# Patient Record
Sex: FEMALE | Race: AMERICAN INDIAN OR ALASKA NATIVE
[De-identification: names, ages, dates, MRNs, and addresses within clinical notes are randomized per-mention and may not be internally consistent; named-entity substitution may affect disease eponyms.]

---

## 2017-09-01 ENCOUNTER — HOSPITAL ENCOUNTER (INPATIENT)
Dept: HOSPITAL 14 - H.ER | Age: 37
LOS: 2 days | Discharge: HOME | DRG: 134 | End: 2017-09-03
Attending: INTERNAL MEDICINE | Admitting: INTERNAL MEDICINE
Payer: MEDICAID

## 2017-09-01 DIAGNOSIS — E87.6: ICD-10-CM

## 2017-09-01 DIAGNOSIS — R00.0: ICD-10-CM

## 2017-09-01 DIAGNOSIS — E66.01: ICD-10-CM

## 2017-09-01 DIAGNOSIS — I10: ICD-10-CM

## 2017-09-01 DIAGNOSIS — Z86.73: ICD-10-CM

## 2017-09-01 DIAGNOSIS — Z79.82: ICD-10-CM

## 2017-09-01 DIAGNOSIS — F17.210: ICD-10-CM

## 2017-09-01 DIAGNOSIS — J98.11: ICD-10-CM

## 2017-09-01 DIAGNOSIS — I16.0: Primary | ICD-10-CM

## 2017-09-01 LAB
ALBUMIN/GLOB SERPL: 1.5 {RATIO} (ref 1–2.1)
ALP SERPL-CCNC: 68 U/L (ref 38–126)
ALT SERPL-CCNC: 33 U/L (ref 9–52)
APTT BLD: 34 SECONDS (ref 25.6–37.1)
AST SERPL-CCNC: 19 U/L (ref 14–36)
BASE EXCESS BLDV CALC-SCNC: 0.7 MMOL/L (ref 0–2)
BASOPHILS # BLD AUTO: 0.1 K/UL (ref 0–0.2)
BASOPHILS NFR BLD: 1.2 % (ref 0–2)
BILIRUB SERPL-MCNC: 0.7 MG/DL (ref 0.2–1.3)
BUN SERPL-MCNC: 10 MG/DL (ref 7–17)
CALCIUM SERPL-MCNC: 9.3 MG/DL (ref 8.4–10.2)
CHLORIDE SERPL-SCNC: 106 MMOL/L (ref 98–107)
CO2 SERPL-SCNC: 23 MMOL/L (ref 22–30)
EOSINOPHIL # BLD AUTO: 0.2 K/UL (ref 0–0.7)
EOSINOPHIL NFR BLD: 2.4 % (ref 0–4)
ERYTHROCYTE [DISTWIDTH] IN BLOOD BY AUTOMATED COUNT: 15.5 % (ref 11.5–14.5)
ETHANOL SERPL-MCNC: < 10 MG/DL (ref 0–10)
GLOBULIN SER-MCNC: 3 GM/DL (ref 2.2–3.9)
GLUCOSE SERPL-MCNC: 154 MG/DL (ref 65–105)
HCT VFR BLD CALC: 39.1 % (ref 34–47)
LYMPHOCYTES # BLD AUTO: 2.9 K/UL (ref 1–4.3)
LYMPHOCYTES NFR BLD AUTO: 30.1 % (ref 20–40)
MCH RBC QN AUTO: 23.8 PG (ref 27–31)
MCHC RBC AUTO-ENTMCNC: 32 G/DL (ref 33–37)
MCV RBC AUTO: 74.5 FL (ref 81–99)
MONOCYTES # BLD: 0.6 K/UL (ref 0–0.8)
MONOCYTES NFR BLD: 6.2 % (ref 0–10)
NEUTROPHILS # BLD: 5.8 K/UL (ref 1.8–7)
NEUTROPHILS NFR BLD AUTO: 60.1 % (ref 50–75)
NRBC BLD AUTO-RTO: 0.1 % (ref 0–0)
PCO2 BLDV: 40 MMHG (ref 40–60)
PH BLDV: 7.41 [PH] (ref 7.32–7.43)
PLATELET # BLD: 183 K/UL (ref 130–400)
PMV BLD AUTO: 10.1 FL (ref 7.2–11.7)
POTASSIUM SERPL-SCNC: 3.6 MMOL/L (ref 3.6–5)
PROT SERPL-MCNC: 7.4 G/DL (ref 6.3–8.2)
SODIUM SERPL-SCNC: 142 MMOL/L (ref 132–148)
TSH SERPL-ACNC: 1.94 MIU/ML (ref 0.46–4.68)
WBC # BLD AUTO: 9.7 K/UL (ref 4.8–10.8)
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
## 2017-09-01 NOTE — CT
PROCEDURE:  CT brain 09/01/2017.



HISTORY:

r/o ICH



COMPARISON:

None available. 



TECHNIQUE:

Axial computed tomography images were obtained through the head/brain 

without intravenous contrast.  



Radiation dose:



Total exam DLP = 1313.31 mGy-cm.



This CT exam was performed using one or more of the following dose 

reduction techniques: Automated exposure control, adjustment of the 

mA and/or kV according to patient size, and/or use of iterative 

reconstruction technique. 



FINDINGS:



HEMORRHAGE:

No acute parenchymal, subarachnoid or extra-axial hemorrhage. 



BRAIN:

No evidence of large acute infarct.  No focal areas of abnormal 

attenuation seen within the substance of the brain.  Ventricular and 

sulcal size are within range of normal this patient's stated age. 



VENTRICLES:

No evidence of obstructive hydrocephalus. 



CALVARIUM:

There are no acute calvarial fractures.



PARANASAL SINUSES:

Mild mucosal thickening right maxillary sinus with minimal mucosal 

thickening few ethmoid air cells and right chamber sphenoid sinus. 



MASTOID AIR CELLS:

Unremarkable as visualized. No inflammatory changes.



OTHER FINDINGS:

None.



IMPRESSION:

No acute intracranial hemorrhage.

## 2017-09-02 LAB
ALBUMIN/GLOB SERPL: 1.4 {RATIO} (ref 1–2.1)
ALP SERPL-CCNC: 64 U/L (ref 38–126)
ALT SERPL-CCNC: 27 U/L (ref 9–52)
AST SERPL-CCNC: 16 U/L (ref 14–36)
BILIRUB SERPL-MCNC: 0.5 MG/DL (ref 0.2–1.3)
BUN SERPL-MCNC: 8 MG/DL (ref 7–17)
CALCIUM SERPL-MCNC: 9.2 MG/DL (ref 8.4–10.2)
CHLORIDE SERPL-SCNC: 105 MMOL/L (ref 98–107)
CHOLEST SERPL-MCNC: 158 MG/DL (ref 0–199)
CO2 SERPL-SCNC: 24 MMOL/L (ref 22–30)
GLOBULIN SER-MCNC: 2.8 GM/DL (ref 2.2–3.9)
GLUCOSE SERPL-MCNC: 172 MG/DL (ref 65–105)
POTASSIUM SERPL-SCNC: 3.5 MMOL/L (ref 3.6–5)
PROT SERPL-MCNC: 6.7 G/DL (ref 6.3–8.2)
SODIUM SERPL-SCNC: 139 MMOL/L (ref 132–148)
TSH SERPL-ACNC: 1.85 MIU/ML (ref 0.46–4.68)

## 2017-09-02 RX ADMIN — PNEUMOCOCCAL VACCINE POLYVALENT ONE
25; 25; 25; 25; 25; 25; 25; 25; 25; 25; 25; 25; 25; 25; 25; 25; 25; 25; 25; 25; 25; 25; 25 INJECTION, SOLUTION INTRAMUSCULAR; SUBCUTANEOUS at 01:21

## 2017-09-02 RX ADMIN — PNEUMOCOCCAL VACCINE POLYVALENT ONE ML
25; 25; 25; 25; 25; 25; 25; 25; 25; 25; 25; 25; 25; 25; 25; 25; 25; 25; 25; 25; 25; 25; 25 INJECTION, SOLUTION INTRAMUSCULAR; SUBCUTANEOUS at 01:17

## 2017-09-02 NOTE — CARD
--------------- APPROVED REPORT --------------





EKG Measurement

Heart [id number]

AR 126P60

[id number]

[id number]

[id number]



<Conclusion>

Normal sinus rhythm

Biatrial enlargement

Left ventricular hypertrophy

Nonspecific T wave abnormality

Prolonged QT

Abnormal ECG

## 2017-09-02 NOTE — CARD
--------------- APPROVED REPORT --------------





EXAM: Two-dimensional and M-mode echocardiogram with Doppler and 

color Doppler.



Other Information 

Quality : AverageRhythm : NSR

Technically limited study due to  body habitus.



INDICATION

Hypertension/HCVD



2D DIMENSIONS 

IVSd1.11   (0.7-1.1cm)LVDd5.04   (3.9-5.9cm)

LVOT Diameter2.12   (1.8-2.4cm)PWd1.20   (0.7-1.1cm)

IVSs1.88   (0.8-1.2cm)LVDs3.56   (2.5-4.0cm)

FS (%) 29.5   %PWs1.72   (0.8-1.2cm)

LVEF (%)55.0   (>50%)



M-Mode DIMENSIONS 

Left Atrium (MM)4.31   (2.5-4.0cm)IVSd2.03   (0.7-1.1cm)

Aortic Root2.94   (2.2-3.7cm)LVDd4.63   (4.0-5.6cm)

Aortic Cusp Exc.2.06   (1.5-2.0cm)PWd1.69   (0.7-1.1cm)

IVSs2.38   cmFS (%) 41   %

LVDs2.75   (2.0-3.8cm)PWs2.41   cm



Mitral Valve

MV E Kxcoijkw08.5cm/sMV DECEL [id number] A Suflpjpc99.6cm/s

MV CLZ65woJ/A ratio1.2MVA (PHT)2.36cm2



TDI

Lateral E' Peak V8.80cm/sMedial E' Peak V5.95cm/sE/Lateral E'6.4

E/Medial E'9.5



Pulmonary Valve

PV Peak Nxximvgt601.6cm/s



 LEFT VENTRICLE 

The left ventricle is normal size.

There is mild concentric left ventricular hypertrophy.

The left ventricular function is normal.

The left ventricular ejection fraction is within the normal range.

There is normal LV segmental wall motion.

The left ventricular diastolic function is normal.



 RIGHT VENTRICLE 

The right ventricle is normal size.

There is normal right ventricular wall thickness.

The right ventricular systolic function is normal.



 ATRIA 

The left atrium is borderline dilated.

The right atrium size is normal.



 AORTIC VALVE 

The aortic valve is not well visualized.

No aortic regurgitation is present.

There is no aortic valvular stenosis.



 MITRAL VALVE 

The mitral valve is mildly thickened.

There is no mitral valve stenosis.

There is no mitral valve regurgitation noted.



 TRICUSPID VALVE 

The tricuspid valve is normal in structure and function.

There is no tricuspid valve regurgitation noted.



 PULMONIC VALVE 

The pulmonary valve is normal in structure and function.

There is no pulmonic valvular regurgitation.



 GREAT VESSELS 

The aortic root is normal in size.

The IVC was not visualized.



 PERICARDIAL EFFUSION 

The pericardium appears normal.



<Conclusion>

The left ventricle is normal size.

There is mild concentric left ventricular hypertrophy.

The left ventricular function is normal.

The left ventricular ejection fraction is within the normal range.

There is normal LV segmental wall motion.

The left ventricular diastolic function is normal.

## 2017-09-03 VITALS — OXYGEN SATURATION: 97 % | RESPIRATION RATE: 18 BRPM

## 2017-09-03 VITALS — HEART RATE: 70 BPM | SYSTOLIC BLOOD PRESSURE: 132 MMHG | DIASTOLIC BLOOD PRESSURE: 72 MMHG | TEMPERATURE: 98.2 F

## 2017-09-03 NOTE — HP
HISTORY OF PRESENT ILLNESS:  This is a 37-year-old  female

with no significant past medical history, presented to emergency room on

the day of admission with complaints of dizziness, headache and generalized

weakness.  The patient was evaluated in the emergency room and she was

found to have elevated blood pressure in the range of 180/110.  The patient

was being given multiple anti-hypertensive medications in the emergency

room and she also had a CAT scan of the head that did not reveal any

significant abnormality.  The patient is a poor historian and stated she

currently lives in a shelter and she recently moved from [place].



PAST MEDICAL HISTORY:  None significant.



FAMILY HISTORY:  Noncontributory.



SOCIAL HISTORY:  The patient is a smoker but denies ETOH or substance

abuse.



REVIEW OF SYSTEMS:  Other review of systems is negative.



ALLERGIES: NO KNOWN ALLERGIES.



MEDICATIONS:  None.



PHYSICAL EXAMINATION

GENERAL:  The patient is comfortable at the time of this examination.

VITAL SIGNS:  Blood pressure in the range of 110/70 after the patient was

already treated, pulse of 70, temperature 98.2, respiratory rate 18.

HEENT:  Pupils equal, reactive to light.  Normal appearing mucosa of the

conjunctivae, oropharynx, and nasal membrane mucosa.

NECK:  Supple.  No JVD.  No carotid bruit.  No lymph node.  No thyromegaly.

CHEST AND LUNGS:  Bilateral symmetrical expansion.  Good air exchange.  No

rales.  No rhonchi.

CARDIOVASCULAR:  PMI not localized.  S1 and S2.  No additional sounds.

ABDOMEN:  Normoactive bowel sounds.  No tenderness.  No organomegaly.  No

masses.

EXTREMITIES:  No cyanosis, no clubbing, no edema.

CENTRAL NERVOUS SYSTEM:  Alert, awake, and oriented x3.  No neurological

deficits could be appreciated.



IMPRESSION:

1.  Hypertensive urgency.

2.  Hypokalemia.



PLAN:  We will do echocardiogram and give patient Nifedipine XL 60 mg and

clonidine 0.2 mg every 8 hours with parameters.  Monitor electrolytes.  We

will also do blood work for lipid profile and the thyroid function test.





__________________________________________

Missael Tate MD





DD:  09/02/2017 21:33:57

DT:  09/03/2017 0:25:23

Job # [id number]

## 2017-09-04 NOTE — DS
REASON FOR ADMISSION:  This is a 37-year-old  female, who

was admitted for uncontrolled hypertension.



COURSE OF HOSPITALIZATION:  The patient was admitted to telemetry floor,

and she was started on antihypertensive medications, both nifedipine and

clonidine.  The patient's blood pressure was well controlled, and she was

discharged home on Procardia 30 mg daily as well as clonidine 0.1 mg q. 8

hours, and to followup with primary care physician.



FINAL DIAGNOSES:

1.  Uncontrolled hypertension.

2.  Morbid obesity.



The patient was counseled for diet and exercise and to follow up with

primary care physician and to take antihypertensive medications.







__________________________________________

Missael Tate MD





DD:  09/03/2017 21:11:35

DT:  09/03/2017 21:50:30

Job # [id number]

## 2017-09-05 NOTE — PQF BMI
***** This form is a permanent part of the medical record*****



9/5/17  Dr. Tate,



EMR has the patient listed as being 5' 9" , weighing 300 pounds with a BMI of 
44.3. Documentation of Morbid Obesity.



Would you please document the BMI.

          

Clarification of your documentation is requested to better reflect the severity 
of illness and intensity of treatment of your patient.  



Indicators present   



 [x] Documented BMI>40  



 [] Nutritional/Dietician consults

              

 [] 100 pounds or more over ideal body weight

   

 [x] Documented BMI / HT & WT: 5'9", weight 300 pounds, BMI 44.3

      

 [] Other : []

   

Location in the medical record that reflects the above clinical findings: []





Other Treatment Provided:  [x] Counseled for diet and exercise by  MD

         

PHYSICIAN'S RESPONSE

  



Based on your medical judgment of the clinical indicators outlined above, 
please define the following:



  [] Morbid obesity with a BMI of 44.3



  [] Morbid obesity with a BMI of ____________ PLEASE SPECIFY

 

  [] Other []

 

  [] If unable to determine, please check the box, sign and date.  



Present On Admission (POA) Indicator:



[] Present at the time of admission 



[] Not present at the time of admission



[] Clinically Undetermined

 







In responding to this query, please exercise your independent professional 
judgment.  The fact that a question is asked does not imply that any particular 
answer is desired or expected.  Thank you for your clarification on this 
documentation.



If you have any questions please call:[phone]

* Thank you,

   Mary Coronado RN

   CDMP
MTDD

## 2017-11-02 ENCOUNTER — HOSPITAL ENCOUNTER (EMERGENCY)
Dept: HOSPITAL 14 - H.ER | Age: 37
Discharge: HOME | End: 2017-11-02
Payer: MEDICAID

## 2017-11-02 VITALS
TEMPERATURE: 98 F | DIASTOLIC BLOOD PRESSURE: 82 MMHG | HEART RATE: 98 BPM | OXYGEN SATURATION: 99 % | SYSTOLIC BLOOD PRESSURE: 151 MMHG | RESPIRATION RATE: 18 BRPM

## 2017-11-02 DIAGNOSIS — Z76.0: Primary | ICD-10-CM

## 2017-11-02 NOTE — ED PDOC
HPI: General Adult


Time Seen by Provider: 11/02/17 20:45


Chief Complaint (Nursing): Med Refill


Chief Complaint (Provider): Med Refill


History Per: Patient


History/Exam Limitations: no limitations


Onset/Duration Of Symptoms: Days (x3)


Current Symptoms Are (Timing): Still Present


Additional Complaint(s): 





Ngozi is a 38 y/o female who presents to the ED for med refill, states she ran 

out of Procardia 3 days ago. Complaining of mild headache. Blood pressure here 

is normal. 





PMD: [facility] 





Past Medical History


Reviewed: Historical Data, Nursing Documentation, Vital Signs


Vital Signs: 





 Last Vital Signs











Temp  98 F   11/02/17 20:36


 


Pulse  98 H  11/02/17 20:36


 


Resp  18   11/02/17 20:36


 


BP  151/82 H  11/02/17 20:36


 


Pulse Ox  99   11/02/17 20:36














- Medical History


PMH: No Chronic Diseases


   Denies: HIV, Hyperthyroidism, Migraine, Chronic Kidney Disease





- Surgical History


Surgical History: No Surg Hx





- Family History


Family History: States: Unknown Family Hx





- Social History


Current smoker - smoking cessation education provided: Yes (Light)


Alcohol: None


Drugs: Denies





- Home Medications


Home Medications: 


 Ambulatory Orders











 Medication  Instructions  Recorded


 


Acetaminophen [Tylenol 325mg tab] 650 mg PO Q6 PRN  tab 09/03/17


 


NIFEdipine ER [Procardia XL] 30 mg PO DAILY #30 ter 09/03/17


 


cloNIDine [Catapres] 0.1 mg PO Q8 #90 tab 09/03/17


 


NIFEdipine ER [Procardia XL] 30 mg PO DAILY #15 tab 11/02/17














- Allergies


Allergies/Adverse Reactions: 


 Allergies











Allergy/AdvReac Type Severity Reaction Status Date / Time


 


No Known Allergies Allergy   Verified 09/01/17 14:39














Review of Systems


ROS Statement: Except As Marked, All Systems Reviewed And Found Negative


Neurological: Positive for: Headache (mild).  Negative for: Dizziness





Physical Exam





- Reviewed


Nursing Documentation Reviewed: Yes


Vital Signs Reviewed: Yes





- Physical Exam


Appears: Positive for: Non-toxic, No Acute Distress


Head Exam: Positive for: ATRAUMATIC, NORMAL INSPECTION, NORMOCEPHALIC


Skin: Positive for: Normal Color, Warm, Dry


Eye Exam: Positive for: EOMI, Normal appearance, PERRL


Neck: Positive for: Normal, Supple


Neurologic/Psych: Positive for: Alert, Oriented





- ECG


O2 Sat by Pulse Oximetry: 99 (RA)


Pulse Ox Interpretation: Normal





Medical Decision Making


Medical Decision Making: 





Time: 20:54


Clinical Impression: Encounter for med refill





Patient medically stable for discharge. Given Rx for Procardia. Patient to 

follow up with PMD. Return if symptoms persist or worsen. 





--------------------------------------------------------------------------------

-----------------


Scribe Attestation:   


Documented by Shanti Hazel, acting as a scribe for Sunshine Valencia PA-C





Provider Scribe Attestation:


All medical record entries made by the Scribe were at my direction and 

personally dictated by me. I have reviewed the chart and agree that the record 

accurately reflects my personal performance of the history, physical exam, 

medical decision making, and the department course for this patient. I have 

also personally directed, reviewed, and agree with the discharge instructions 

and disposition.





Disposition





- Clinical Impression


Clinical Impression: 


 Medication refill








- Patient ED Disposition


Is Patient to be Admitted: No


Counseled Patient/Family Regarding: Need For Followup, Rx Given





- Disposition


Disposition: Routine/Home


Disposition Time: 20:54


Condition: FAIR


Prescriptions: 


NIFEdipine ER [Procardia XL] 30 mg PO DAILY #15 tab


Instructions:  Nifedipine (By mouth), Hypertension (ED)


Forms:  [facility] (English)

## 2017-11-28 ENCOUNTER — HOSPITAL ENCOUNTER (EMERGENCY)
Dept: HOSPITAL 14 - H.ER | Age: 37
Discharge: HOME | End: 2017-11-28
Payer: MEDICAID

## 2017-11-28 VITALS — SYSTOLIC BLOOD PRESSURE: 149 MMHG | HEART RATE: 90 BPM | DIASTOLIC BLOOD PRESSURE: 92 MMHG

## 2017-11-28 VITALS — TEMPERATURE: 97 F | OXYGEN SATURATION: 99 % | RESPIRATION RATE: 18 BRPM

## 2017-11-28 DIAGNOSIS — R07.89: Primary | ICD-10-CM

## 2017-11-28 LAB
ALBUMIN/GLOB SERPL: 1.2 {RATIO} (ref 1–2.1)
ALP SERPL-CCNC: 63 U/L (ref 38–126)
ALT SERPL-CCNC: 26 U/L (ref 9–52)
AST SERPL-CCNC: 39 U/L (ref 14–36)
BASOPHILS # BLD AUTO: 0.2 K/UL (ref 0–0.2)
BASOPHILS NFR BLD: 1.4 % (ref 0–2)
BILIRUB SERPL-MCNC: 0.8 MG/DL (ref 0.2–1.3)
BUN SERPL-MCNC: 14 MG/DL (ref 7–17)
CALCIUM SERPL-MCNC: 9.1 MG/DL (ref 8.4–10.2)
CHLORIDE SERPL-SCNC: 108 MMOL/L (ref 98–107)
CO2 SERPL-SCNC: 24 MMOL/L (ref 22–30)
EOSINOPHIL # BLD AUTO: 0.2 K/UL (ref 0–0.7)
EOSINOPHIL NFR BLD: 1.8 % (ref 0–4)
ERYTHROCYTE [DISTWIDTH] IN BLOOD BY AUTOMATED COUNT: 16.1 % (ref 11.5–14.5)
GLOBULIN SER-MCNC: 3.6 GM/DL (ref 2.2–3.9)
GLUCOSE SERPL-MCNC: 207 MG/DL (ref 65–105)
HCT VFR BLD CALC: 39.9 % (ref 34–47)
LYMPHOCYTES # BLD AUTO: 3.2 K/UL (ref 1–4.3)
LYMPHOCYTES NFR BLD AUTO: 25.7 % (ref 20–40)
MCH RBC QN AUTO: 23.9 PG (ref 27–31)
MCHC RBC AUTO-ENTMCNC: 31.6 G/DL (ref 33–37)
MCV RBC AUTO: 75.5 FL (ref 81–99)
MONOCYTES # BLD: 0.7 K/UL (ref 0–0.8)
MONOCYTES NFR BLD: 5.5 % (ref 0–10)
NEUTROPHILS # BLD: 8.3 K/UL (ref 1.8–7)
NEUTROPHILS NFR BLD AUTO: 65.6 % (ref 50–75)
NRBC BLD AUTO-RTO: 0.1 % (ref 0–0)
PLATELET # BLD: 161 K/UL (ref 130–400)
PMV BLD AUTO: 10.4 FL (ref 7.2–11.7)
POTASSIUM SERPL-SCNC: 5.4 MMOL/L (ref 3.6–5)
PROT SERPL-MCNC: 8 G/DL (ref 6.3–8.2)
SODIUM SERPL-SCNC: 140 MMOL/L (ref 132–148)
WBC # BLD AUTO: 12.6 K/UL (ref 4.8–10.8)

## 2017-11-28 NOTE — RAD
HISTORY:

chest pain  



COMPARISON:

Chest x-ray performed 9/1/17 



TECHNIQUE:

Chest, one view.



FINDINGS:

Examination limited by habitus.



LUNGS:

Mild interstitial prominence possibly chronic however mild infection 

or edema cannot be entirely excluded.  Correlate clinically. No focal 

consolidation.



Please note that chest x-ray has limited sensitivity for the 

detection of pulmonary masses.



PLEURA:

No significant pleural effusion identified. No definite pneumothorax .



CARDIOVASCULAR:

Heart size appears top normal.



OSSEOUS STRUCTURES:

 No acute osseous abnormality identified.



VISUALIZED UPPER ABDOMEN:

Unremarkable.



OTHER FINDINGS:

None.



IMPRESSION:

Mild interstitial prominence possibly chronic however mild infection 

or edema cannot be entirely excluded. Correlate clinically.

## 2017-11-28 NOTE — ED PDOC
HPI: Chest Pain


Time Seen by Provider: 11/28/17 07:44


Chief Complaint (Nursing): Upper Extremity Problem/Injury


Chief Complaint (Provider): Left sided chest pain


History Per: Patient


History/Exam Limitations: no limitations


Onset/Duration Of Symptoms: Days (x1)


Current Symptoms Are (Timing): Still Present


Pain Scale Rating Of: 8


Associated Symptoms: denies: Nausea


Additional Complaint(s): 





Ngozi Malik is a 37 year old female, with no past medical history, who 

presents to the emergency department complaining of a worsening left sided 

chest pain onset since yesterday. Patient states she was at work yesterday when 

the pain began. She took Tylenol with no relief of symptoms. She denies any 

fever, chills, cough, nausea, vomit or diarrhea. No further medical complaints.





PMD: None provided. 





Past Medical History


Reviewed: Historical Data, Nursing Documentation, Vital Signs


Vital Signs: 


 Last Vital Signs











Temp  97 F L  11/28/17 07:42


 


Pulse  99 H  11/28/17 14:04


 


Resp  18   11/28/17 07:42


 


BP  169/111 H  11/28/17 07:42


 


Pulse Ox  99   11/28/17 14:04














- Medical History


PMH: 


   Denies: HIV, Hyperthyroidism, Migraine, Chronic Kidney Disease





- Family History


Family History: States: Unknown Family Hx





- Social History


Current smoker - smoking cessation education provided: Yes (light smoker <10 

cigarettes daily)


Alcohol: None


Drugs: Denies





- Home Medications


Home Medications: 


 Ambulatory Orders











 Medication  Instructions  Recorded


 


Acetaminophen [Tylenol 325mg tab] 650 mg PO Q6 PRN  tab 09/03/17


 


NIFEdipine ER [Procardia XL] 30 mg PO DAILY #30 ter 09/03/17


 


cloNIDine [Catapres] 0.1 mg PO Q8 #90 tab 09/03/17


 


NIFEdipine ER [Procardia XL] 30 mg PO DAILY #15 tab 11/02/17


 


Azithromycin [Zithromax] 250 mg PO DAILY #6 tab 11/28/17














- Allergies


Allergies/Adverse Reactions: 


 Allergies











Allergy/AdvReac Type Severity Reaction Status Date / Time


 


No Known Allergies Allergy   Verified 09/01/17 14:39














Review of Systems


ROS Statement: Except As Marked, All Systems Reviewed And Found Negative


Constitutional: Negative for: Fever, Chills


Cardiovascular: Positive for: Chest Pain (left sided)


Respiratory: Negative for: Cough


Gastrointestinal: Negative for: Nausea, Vomiting, Diarrhea





Physical Exam





- Reviewed


Nursing Documentation Reviewed: Yes


Vital Signs Reviewed: Yes





- Physical Exam


Appears: Positive for: Well, Non-toxic, No Acute Distress


Head Exam: Positive for: ATRAUMATIC, NORMAL INSPECTION, NORMOCEPHALIC


Skin: Positive for: Normal Color, Warm, Dry


Eye Exam: Positive for: EOMI, Normal appearance, PERRL


Neck: Positive for: Normal, Painless ROM, Supple


Cardiovascular/Chest: Positive for: Regular Rate, Rhythm.  Negative for: Murmur


Respiratory: Positive for: Normal Breath Sounds.  Negative for: Respiratory 

Distress


Gastrointestinal/Abdominal: Positive for: Normal Exam, Bowel Sounds, Soft.  

Negative for: Tenderness, Guarding, Rebound


Back: Positive for: Normal Inspection.  Negative for: L CVA Tenderness, R CVA 

Tenderness


Extremity: Positive for: Normal ROM.  Negative for: Deformity, Swelling


Neurologic/Psych: Positive for: Alert, Oriented.  Negative for: Motor/Sensory 

Deficits





- Laboratory Results


Result Diagrams: 


 11/28/17 08:43





 11/28/17 08:43





- ECG


ECG Rhythm: Positive for: Sinus Rhythm (normal)


Interpretation Of ECG: 





Left atrial enlargement


Rate: 99


O2 Sat by Pulse Oximetry: 99 (RA)


Pulse Ox Interpretation: Normal





Medical Decision Making


Medical Decision Making: 





Initial Impression: Chest pain





Initial Plan:





--Comp metabolic Panel


--Troponin I


--CBC w/ differential


--Chest portable [RAD]


--reevaluation








1028


Chest x-ray


FINDINGS:


Examination limited by habitus.





LUNGS:


Mild interstitial prominence possibly chronic however mild infection or edema 

cannot be entirely excluded.  Correlate clinically. No focal consolidation.





Please note that chest x-ray has limited sensitivity for the detection of 

pulmonary masses.





PLEURA:


No significant pleural effusion identified. No definite pneumothorax .





CARDIOVASCULAR:


Heart size appears top normal.





OSSEOUS STRUCTURES:


 No acute osseous abnormality identified.





VISUALIZED UPPER ABDOMEN:


Unremarkable.





OTHER FINDINGS:


None.





IMPRESSION:


Mild interstitial prominence possibly chronic however mild infection or edema 

cannot be entirely excluded. Correlate clinically.








1350


-Upon provider evaluation patient is medically stable, and requires no further 

treatment in the ED at this time. Patient will be discharged home. Counseling 

was provided and all questions were answered regarding diagnosis and need for 

follow up. There is agreement to discharge plan. Return if symptoms persist or 

worsen.





--------------------------------------------------------------------------------

-----------------


Scribe Attestation:


Documented by Rehan Triplett, acting as a scribe for Barrett Lepe MD





Provider Scribe Attestation:


All medical record entries made by the Scribe were at my direction and 

personally dictated by me. I have reviewed the chart and agree that the record 

accurately reflects my personal performance of the history, physical exam, 

medical decision making, and the department course for this patient. I have 

also personally directed, reviewed, and agree with the discharge instructions 

and disposition.





Disposition





- Clinical Impression


Clinical Impression: 


 Chest pain








- Disposition


Referrals: 


[facility] [Outside]


[facility] [Outside]


Disposition Time: 13:50


Condition: IMPROVED


Additional Instructions: 


follow up with your primary doctor in 1- 2 days


return to the ED with any worsening or concerning symptoms


Prescriptions: 


Azithromycin [Zithromax] 250 mg PO DAILY #6 tab


Instructions:  Costochondritis (ED)


Forms:  [facility] (English)

## 2017-12-18 ENCOUNTER — HOSPITAL ENCOUNTER (EMERGENCY)
Dept: HOSPITAL 14 - H.ER | Age: 37
Discharge: HOME | End: 2017-12-18
Payer: MEDICAID

## 2017-12-18 VITALS
SYSTOLIC BLOOD PRESSURE: 143 MMHG | TEMPERATURE: 98.1 F | RESPIRATION RATE: 18 BRPM | DIASTOLIC BLOOD PRESSURE: 73 MMHG | OXYGEN SATURATION: 98 % | HEART RATE: 80 BPM
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
## 2017-12-18 DIAGNOSIS — M25.551: Primary | ICD-10-CM

## 2017-12-18 NOTE — ED PDOC
Lower Extremity Pain/Injury


Time Seen by Provider: 12/18/17 21:15


Chief Complaint (Nursing): Hip Pain


Chief Complaint (Provider): Right Hip Pain


History Per: Patient


History/Exam Limitations: no limitations


Onset/Duration Of Symptoms: Days (x3)


Current Symptoms Are (Timing): Still Present


Additional Complaint(s): 





Ngozi Malik is a 37 year old female that presents to the ED with a 

chief complaint of right hip pain that she has been experiencing for the past 

three days as a result of a fall. Patient reports that three days ago, she was 

shopping when she tripped and fell in the aisle. She states that she hit her 

head and does not recollect anything until she regained consciousness when she 

was with EMS, who brought her to [facility] [place] [facility]. Patient reports that she had an X-Ray 

performed on her left hip, but not her right. She states that she has been 

taking Tylenol for her pain and that it has not been providing her any relief. 





Past Medical History


Reviewed: Historical Data, Nursing Documentation, Vital Signs


Vital Signs: 





 Last Vital Signs











Temp  98.1 F   12/18/17 21:05


 


Pulse  80   12/18/17 21:05


 


Resp  18   12/18/17 21:05


 


BP  143/73   12/18/17 21:05


 


Pulse Ox  98   12/18/17 21:05














- Medical History


PMH: 


   Denies: HIV, Hyperthyroidism, Migraine, Chronic Kidney Disease





- Family History


Family History: States: Unknown Family Hx





- Home Medications


Home Medications: 


 Ambulatory Orders











 Medication  Instructions  Recorded


 


Acetaminophen [Tylenol 325mg tab] 650 mg PO Q6 PRN  tab 09/03/17


 


NIFEdipine ER [Procardia XL] 30 mg PO DAILY #30 ter 09/03/17


 


cloNIDine [Catapres] 0.1 mg PO Q8 #90 tab 09/03/17


 


NIFEdipine ER [Procardia XL] 30 mg PO DAILY #15 tab 11/02/17


 


Azithromycin [Zithromax] 250 mg PO DAILY #6 tab 11/28/17


 


Naproxen 1 tab PO Q12 PRN #14 tab 12/18/17














- Allergies


Allergies/Adverse Reactions: 


 Allergies











Allergy/AdvReac Type Severity Reaction Status Date / Time


 


No Known Allergies Allergy   Verified 09/01/17 14:39














Review of Systems


Musculoskeletal: Positive for: Leg Pain (right hip pain)





Physical Exam





- Reviewed


Nursing Documentation Reviewed: Yes


Vital Signs Reviewed: Yes





- Physical Exam


Appears: Positive for: Non-toxic, No Acute Distress (Patient is very sleepy 

during exam.)


Head Exam: Positive for: ATRAUMATIC, NORMOCEPHALIC


Skin: Positive for: Normal Color, Warm


Eye Exam: Positive for: Normal appearance, EOMI, PERRL


Back: Negative for: Normal Inspection (Mild paralumbar TTP)


Extremity: Positive for: Other (TTP gluteal region. No ecchymosis noted.)


Neurologic/Psych: Positive for: Alert, Oriented.  Negative for: Motor/Sensory 

Deficits





- ECG


O2 Sat by Pulse Oximetry: 98 (RA)


Pulse Ox Interpretation: Normal





- Progress


ED Course And Treament: 





XRY OF HIP: NEG FOR FX








Medical Decision Making


Medical Decision Making: 





Impression: Right Hip Pain





Plan:


* X-Ray Right Hip


* Urine Pregnancy


* Reevaluation


--------------------------------------------------------------------------------

----------------- 


Scribe Attestation:


Documented by Steffi Young, acting as a scribe for Sunshine Valencia PA-C.





Provider Scribe Attestation:


All medical record entries made by the Scribe were at my direction and 

personally dictated by me. I have reviewed the chart and agree that the record 

accurately reflects my personal performance of the history, physical exam, 

medical decision making, and the department course for this patient. I have 

also personally directed, reviewed, and agree with the discharge instructions 

and disposition.





Disposition





- Clinical Impression


Clinical Impression: 


 Hip pain








- Patient ED Disposition


Is Patient to be Admitted: No





- Disposition


Referrals: 


[facility] [Outside]


Disposition: Routine/Home


Disposition Time: 23:39


Condition: FAIR


Prescriptions: 


Naproxen 1 tab PO Q12 PRN #14 tab


 PRN Reason: Pain, Moderate (4-7)


Instructions:  Contusion in Adults (ED), Acute Low Back Pain (GEN)


Forms:  [facility] (English)

## 2017-12-19 NOTE — RAD
PROCEDURE:  Right Hip Radiographs.



HISTORY:

Fall



COMPARISON:

None.



FINDINGS:



BONES:

The pelvic ring is intact. There is an apparent longitudinal lucency 

in the lateral right intertrochanteric region. There is no bone 

destruction.  Bone alignment and mineralization are normal. 



JOINTS:

The hip joint spaces are preserved. There is mild degenerative 

osteoarthrosis in the right sacroiliac joint.  There is moderate 

osteitis pubis. 



SOFT TISSUES:

Normal. 



OTHER FINDINGS:

None.



IMPRESSION:

No acute displaced fracture or dislocation. Apparent longitudinal 

lucency in the lateral right intertrochanteric region is nonspecific 

and could be artifactual however nondisplaced fracture cannot be 

entirely excluded. Please note occult fractures cannot be excluded on 

plain radiographs.  If there is a persistent clinical concern, an MRI 

of the hip may be performed for further evaluation.

## 2018-01-07 ENCOUNTER — HOSPITAL ENCOUNTER (EMERGENCY)
Dept: HOSPITAL 14 - H.ER | Age: 38
Discharge: HOME | End: 2018-01-07
Payer: MEDICAID

## 2018-01-07 VITALS — OXYGEN SATURATION: 100 %

## 2018-01-07 VITALS
DIASTOLIC BLOOD PRESSURE: 87 MMHG | RESPIRATION RATE: 18 BRPM | SYSTOLIC BLOOD PRESSURE: 134 MMHG | TEMPERATURE: 98.6 F | HEART RATE: 87 BPM

## 2018-01-07 DIAGNOSIS — J32.0: ICD-10-CM

## 2018-01-07 DIAGNOSIS — I10: ICD-10-CM

## 2018-01-07 DIAGNOSIS — R51: Primary | ICD-10-CM

## 2018-01-07 DIAGNOSIS — E11.9: ICD-10-CM

## 2018-01-07 LAB
BASOPHILS # BLD AUTO: 0.2 K/UL (ref 0–0.2)
BASOPHILS NFR BLD: 1.5 % (ref 0–2)
BUN SERPL-MCNC: 8 MG/DL (ref 7–17)
CALCIUM SERPL-MCNC: 8.9 MG/DL (ref 8.4–10.2)
EOSINOPHIL # BLD AUTO: 0.2 K/UL (ref 0–0.7)
EOSINOPHIL NFR BLD: 1.9 % (ref 0–4)
ERYTHROCYTE [DISTWIDTH] IN BLOOD BY AUTOMATED COUNT: 15.2 % (ref 11.5–14.5)
GFR NON-AFRICAN AMERICAN: > 60
HGB BLD-MCNC: 12.3 G/DL (ref 12–16)
LYMPHOCYTES # BLD AUTO: 2.4 K/UL (ref 1–4.3)
LYMPHOCYTES NFR BLD AUTO: 20 % (ref 20–40)
MCH RBC QN AUTO: 23.9 PG (ref 27–31)
MCHC RBC AUTO-ENTMCNC: 31.4 G/DL (ref 33–37)
MCV RBC AUTO: 76.2 FL (ref 81–99)
MONOCYTES # BLD: 0.6 K/UL (ref 0–0.8)
MONOCYTES NFR BLD: 5.3 % (ref 0–10)
NEUTROPHILS # BLD: 8.5 K/UL (ref 1.8–7)
NEUTROPHILS NFR BLD AUTO: 71.3 % (ref 50–75)
NRBC BLD AUTO-RTO: 0.2 % (ref 0–0)
PLATELET # BLD: 186 K/UL (ref 130–400)
PMV BLD AUTO: 10.1 FL (ref 7.2–11.7)
RBC # BLD AUTO: 5.15 MIL/UL (ref 3.8–5.2)
WBC # BLD AUTO: 11.9 K/UL (ref 4.8–10.8)

## 2018-01-07 PROCEDURE — 85025 COMPLETE CBC W/AUTO DIFF WBC: CPT

## 2018-01-07 PROCEDURE — 80353 DRUG SCREENING COCAINE: CPT

## 2018-01-07 PROCEDURE — 80324 DRUG SCREEN AMPHETAMINES 1/2: CPT

## 2018-01-07 PROCEDURE — 99284 EMERGENCY DEPT VISIT MOD MDM: CPT

## 2018-01-07 PROCEDURE — 80346 BENZODIAZEPINES1-12: CPT

## 2018-01-07 PROCEDURE — 96361 HYDRATE IV INFUSION ADD-ON: CPT

## 2018-01-07 PROCEDURE — 81025 URINE PREGNANCY TEST: CPT

## 2018-01-07 PROCEDURE — 80361 OPIATES 1 OR MORE: CPT

## 2018-01-07 PROCEDURE — 80048 BASIC METABOLIC PNL TOTAL CA: CPT

## 2018-01-07 PROCEDURE — 96375 TX/PRO/DX INJ NEW DRUG ADDON: CPT

## 2018-01-07 PROCEDURE — 70450 CT HEAD/BRAIN W/O DYE: CPT

## 2018-01-07 PROCEDURE — 83992 ASSAY FOR PHENCYCLIDINE: CPT

## 2018-01-07 PROCEDURE — 80345 DRUG SCREENING BARBITURATES: CPT

## 2018-01-07 PROCEDURE — 96374 THER/PROPH/DIAG INJ IV PUSH: CPT

## 2018-01-07 PROCEDURE — 80349 CANNABINOIDS NATURAL: CPT

## 2018-01-07 PROCEDURE — 80358 DRUG SCREENING METHADONE: CPT

## 2018-01-07 NOTE — CT
PROCEDURE:  CT HEAD WITHOUT CONTRAST.



HISTORY:

headache



COMPARISON:

09/01/2017 



TECHNIQUE:

Axial computed tomography images were obtained through the head/brain 

without intravenous contrast.  



Radiation dose:



Total exam DLP = 1603 mGy-cm.



This CT exam was performed using one or more of the following dose 

reduction techniques: Automated exposure control, adjustment of the 

mA and/or kV according to patient size, and/or use of iterative 

reconstruction technique.



FINDINGS:



HEMORRHAGE:

No intracranial hemorrhage. 



BRAIN:

No mass effect or edema.  No cortical atrophy is seen. No 

intracranial hemorrhage is noted. No extra-axial fluid collection is 

seen. Ventricles are normal in size and midline. No appreciable 

decreased density to suggest recent infarct is noted.  There is a 

stable small a medial left temporal lobe very vascular space.  

Posterior fossa is unremarkable. Pituitary gland is normal in size.  

No tonsillar ectopia is seen.



VENTRICLES:

Unremarkable. No hydrocephalus. 



CALVARIUM:

Unremarkable.



PARANASAL SINUSES:

Since the prior examination there has been interval increase in 

mucosal inflammatory changes in the right maxillary sinus.  There 

appears to be obstruction of the right ostiomeatal complex on the 

coronal images. Mild mucosal changes are seen in the sinuses 

elsewhere.



MASTOID AIR CELLS:

Unremarkable as visualized. No inflammatory changes.



OTHER FINDINGS:

None.



IMPRESSION:

No evidence of recent infarct or intracranial hemorrhage. Moderate 

right maxillary sinusitis. 



No other significant significant interval change from prior study.

## 2018-01-07 NOTE — ED PDOC
HPI:  Headache


Time Seen by Provider: 01/07/18 14:06


Chief Complaint (Nursing): Headache


Chief Complaint (Provider): Headache


History Per: Patient


History/Exam Limitations: no limitations


Onset/Duration Of Symptoms: Days (7 days ago), Intermittent Episodes


Current Symptoms Are (Timing): Constant


Additional Complaint(s): 


38 y/o female with a history of hypertension, presents to the ED complaining of 

constant, intermittent headache, onset of 7 days. Patient states that she has 

been having headaches all her life, and is usually triggered when her blood 

pressure is high due to her history of hypertension. She describes the headache 

as pressure radiating throughout her entire head, and normally experiences some 

relief after taking Clonatin coupled with another type of high blood pressure 

medication, not directly specified. She presents today with a headache, onset 

of 1 hr ago after not be able to find the Clonatin and has been taking the 

other medication without relief, and notes that she is sensitive to light. 





PCP: Rodrigo Lacey








Past Medical History


Reviewed: Historical Data, Nursing Documentation, Vital Signs


Vital Signs: 





 Last Vital Signs











Temp  97.5 F L  01/07/18 13:54


 


Pulse  115 H  01/07/18 13:54


 


Resp  16   01/07/18 13:54


 


BP  189/112 H  01/07/18 13:54


 


Pulse Ox  100   01/07/18 13:54














- Medical History


PMH: Diabetes, HTN


   Denies: HIV, Hyperthyroidism, Migraine, Chronic Kidney Disease





- Surgical History


Surgical History: No Surg Hx





- Family History


Family History: States: Unknown Family Hx





- Social History


Current smoker - smoking cessation education provided: Yes (10 cigarettes daily)


Alcohol: None


Drugs: Denies





- Home Medications


Home Medications: 


 Ambulatory Orders











 Medication  Instructions  Recorded


 


Acetaminophen [Tylenol 325mg tab] 650 mg PO Q6 PRN  tab 09/03/17


 


NIFEdipine ER [Procardia XL] 30 mg PO DAILY #30 ter 09/03/17


 


NIFEdipine ER [Procardia XL] 30 mg PO DAILY #15 tab 11/02/17


 


Azithromycin [Zithromax] 250 mg PO DAILY #6 tab 11/28/17


 


Naproxen 1 tab PO Q12 PRN #14 tab 12/18/17


 


cloNIDine [Catapres] 0.1 mg PO Q8 #90 tab 01/07/18














- Allergies


Allergies/Adverse Reactions: 


 Allergies











Allergy/AdvReac Type Severity Reaction Status Date / Time


 


Seafood Allergy  SHORTNESS Uncoded 01/07/18 13:55





   OF BREATH  














Review of Systems


ROS Statement: Except As Marked, All Systems Reviewed And Found Negative


Neurological: Positive for: Headache, Other (sensitive to light)





Physical Exam





- Reviewed


Nursing Documentation Reviewed: Yes


Vital Signs Reviewed: Yes





- Physical Exam


Appears: Positive for: Non-toxic, No Acute Distress


Head Exam: Positive for: ATRAUMATIC, NORMOCEPHALIC


Skin: Positive for: Normal Color, Warm


Eye Exam: Positive for: Normal appearance, EOMI, PERRL


ENT: Positive for: Normal ENT Inspection


Neck: Positive for: Normal, Painless ROM, Supple


Cardiovascular/Chest: Positive for: Regular Rate, Rhythm.  Negative for: Murmur


Respiratory: Positive for: Normal Breath Sounds.  Negative for: Respiratory 

Distress


Gastrointestinal/Abdominal: Positive for: Normal Exam, Soft.  Negative for: 

Tenderness


Back: Positive for: Normal Inspection


Extremity: Positive for: Normal ROM.  Negative for: Pedal Edema, Deformity


Neurologic/Psych: Positive for: Alert, Oriented.  Negative for: Motor/Sensory 

Deficits





- Laboratory Results


Result Diagrams: 


 01/07/18 15:15





 01/07/18 15:15





- ECG


O2 Sat by Pulse Oximetry: 100 (RA)


Pulse Ox Interpretation: Normal





- Progress


Re-evaluation Time: 18:56


Condition: Re-examined, Improved





Medical Decision Making


Medical Decision Making: 


Time: 14:37


Impression: 


--Headache in setting of hypertension


Differential:


--Hypertensive urgent, intracranial bleeding due to hypertension


Plan:


--CT Head W/O Contrast


--Labs


--Drug Screen, Urine


--ED Urine Dip


--Ed Urine Pregnancy


--Clonidine 0.2mg PO


--Toradol 30mg IVP


--Metoclopramide 10mg IVP


--IV Fluids


--Reassess





Time: 17:28


CT Head Findings:


HEMORRHAGE:


No intracranial hemorrhage. 





BRAIN:


No mass effect or edema.  No cortical atrophy is seen. No intracranial 

hemorrhage is noted. No extra-axial fluid collection is seen. Ventricles are 

normal in size and midline. No appreciable decreased density to suggest recent 

infarct is noted.  There is a stable small a medial left temporal lobe very 

vascular space.  Posterior fossa is unremarkable. Pituitary gland is normal in 

size.  No tonsillar ectopia is seen.





VENTRICLES:


Unremarkable. No hydrocephalus. 





CALVARIUM:


Unremarkable.





PARANASAL SINUSES:


Since the prior examination there has been interval increase in mucosal 

inflammatory changes in the right maxillary sinus.  There appears to be 

obstruction of the right ostiomeatal complex on the coronal images. Mild 

mucosal changes are seen in the sinuses elsewhere.





MASTOID AIR CELLS:


Unremarkable as visualized. No inflammatory changes.





OTHER FINDINGS:


None.





IMPRESSION:


No evidence of recent infarct or intracranial hemorrhage. Moderate right 

maxillary sinusitis. 


No other significant significant interval change from prior study.





Time: 18:56


--Upon provider evaluation patient's condition is improved and patient is 

feeling much better. Patient will be discharged with Rx for Catapres. 

Counseling was provided and all questions were answered regarding diagnosis and 

need for follow up with PMD. There is agreement to discharge plan. Return if 

symptoms persist or worsen.








--------------------------------------------------------------------------------

------------------------------------------------


Scribe Attestation:


Documented by Jere Henning and Nitesh Stanley acting as a scribe for Kelsey Pascal MD. 








Disposition





- Clinical Impression


Clinical Impression: 


 Headache








- Patient ED Disposition


Is Patient to be Admitted: No


Counseled Patient/Family Regarding: Studies Performed, Diagnosis, Need For 

Followup, Rx Given





- Disposition


Referrals: 


[facility] [Outside]


Disposition: Routine/Home


Disposition Time: 18:56


Condition: GOOD


Additional Instructions: 


Return for worsening. Follow up with your PCP in 2-3 days.


Prescriptions: 


cloNIDine [Catapres] 0.1 mg PO Q8 #90 tab


Instructions:  Hypertension (ED), General Headache (ED)

## 2018-01-20 ENCOUNTER — HOSPITAL ENCOUNTER (EMERGENCY)
Dept: HOSPITAL 14 - H.ER | Age: 38
Discharge: HOME | End: 2018-01-20
Payer: MEDICAID

## 2018-01-20 VITALS
OXYGEN SATURATION: 98 % | SYSTOLIC BLOOD PRESSURE: 147 MMHG | RESPIRATION RATE: 16 BRPM | HEART RATE: 76 BPM | DIASTOLIC BLOOD PRESSURE: 83 MMHG

## 2018-01-20 VITALS — TEMPERATURE: 98.6 F

## 2018-01-20 VITALS — BODY MASS INDEX: 38 KG/M2

## 2018-01-20 DIAGNOSIS — E11.9: ICD-10-CM

## 2018-01-20 DIAGNOSIS — I10: ICD-10-CM

## 2018-01-20 DIAGNOSIS — K08.89: Primary | ICD-10-CM

## 2018-01-20 DIAGNOSIS — F17.210: ICD-10-CM

## 2018-01-20 NOTE — ED PDOC
HPI: General Adult


Time Seen by Provider: 01/20/18 09:13


Chief Complaint (Nursing): Chest Pain


Chief Complaint (Provider): Dental Pain


History Per: Patient


History/Exam Limitations: no limitations


Onset/Duration Of Symptoms: Days (3 days ago)


Current Symptoms Are (Timing): Still Present


Additional Complaint(s): 


36 y/o female with a history of Hypertension and Diabetes presents to the ED 

with left sided dental pain that radiates to left ear, onset of 3 days. Patient 

states that she was seen by her dentist and was advised to be on antibiotics 

prior to dental extraction. She denies any fever or drainage to teeth. 








Past Medical History


Reviewed: Historical Data, Nursing Documentation, Vital Signs


Vital Signs: 


 Last Vital Signs











Temp  98.6 F   01/20/18 08:51


 


Pulse  85   01/20/18 08:51


 


Resp  17   01/20/18 08:51


 


BP  192/113 H  01/20/18 08:51


 


Pulse Ox  99   01/20/18 10:40














- Medical History


PMH: Diabetes, Gastritis, HTN


   Denies: HIV, Hyperthyroidism, Migraine, Chronic Kidney Disease





- Surgical History


Surgical History: No Surg Hx





- Family History


Family History: States: Unknown Family Hx





- Social History


Current smoker - smoking cessation education provided: Yes (light )


SMOKER/PACKS PER DAY:: 10 (cigarettes daily)


Alcohol: None


Drugs: Denies





- Home Medications


Home Medications: 


 Ambulatory Orders











 Medication  Instructions  Recorded


 


Acetaminophen [Tylenol 325mg tab] 650 mg PO Q6 PRN  tab 09/03/17


 


NIFEdipine ER [Procardia XL] 30 mg PO DAILY #30 ter 09/03/17


 


NIFEdipine ER [Procardia XL] 30 mg PO DAILY #15 tab 11/02/17


 


Azithromycin [Zithromax] 250 mg PO DAILY #6 tab 11/28/17


 


Naproxen 1 tab PO Q12 PRN #14 tab 12/18/17


 


cloNIDine [Catapres] 0.1 mg PO Q8 #90 tab 01/07/18


 


Clindamycin [Cleocin] 300 mg PO TID #30 cap 01/20/18


 


amLODIPine [Norvasc] 5 mg PO DAILY #30 tab 01/20/18


 


traMADol [Ultram] 50 mg PO Q8 #10 tab 01/20/18














- Allergies


Allergies/Adverse Reactions: 


 Allergies











Allergy/AdvReac Type Severity Reaction Status Date / Time


 


Seafood Allergy  SHORTNESS Uncoded 01/20/18 09:01





   OF BREATH  














Review of Systems


ROS Statement: Except As Marked, All Systems Reviewed And Found Negative


Constitutional: Negative for: Fever


ENT: Positive for: Ear Pain, Other (dental pain, no drainage to teeth)





Physical Exam





- Reviewed


Nursing Documentation Reviewed: Yes


Vital Signs Reviewed: Yes





- Physical Exam


Appears: Positive for: Non-toxic, No Acute Distress


Head Exam: Positive for: ATRAUMATIC


Skin: Positive for: Normal Color, Warm


Eye Exam: Positive for: Normal appearance


ENT: Positive for: Normal ENT Inspection, Other (mouth: upper molar gums swollen

, no fluctuance, no drainage)


Neck: Positive for: Normal, Painless ROM, Supple


Cardiovascular/Chest: Positive for: Regular Rate, Rhythm.  Negative for: Murmur


Respiratory: Positive for: Normal Breath Sounds.  Negative for: Respiratory 

Distress


Back: Positive for: Normal Inspection


Extremity: Positive for: Normal ROM.  Negative for: Pedal Edema, Deformity


Neurologic/Psych: Positive for: Alert, Oriented.  Negative for: Motor/Sensory 

Deficits





- ECG


O2 Sat by Pulse Oximetry: 99 (RA)


Pulse Ox Interpretation: Normal





Medical Decision Making


Medical Decision Making: 





Time:


--09:25


Impression: 


--left sided dental pain


Plan:


--Clonidine 0.1 mg PO


--Tramadol 50mg PO





Reassess


--





Scribe Attestation:


Documented by Jere Henning acting as a scribe for Deshawn Gonzalez MD. 





Disposition





- Clinical Impression


Clinical Impression: 


 Pain, dental, Hypertension








- Patient ED Disposition


Is Patient to be Admitted: No


Counseled Patient/Family Regarding: Diagnosis, Need For Followup, Rx Given





- Disposition


Referrals: 


Familia Lynn DDS [Staff Provider] - 


[facility] [Outside]


Disposition: Routine/Home


Disposition Time: 10:37


Condition: FAIR


Prescriptions: 


amLODIPine [Norvasc] 5 mg PO DAILY #30 tab


Clindamycin [Cleocin] 300 mg PO TID #30 cap


traMADol [Ultram] 50 mg PO Q8 #10 tab


Instructions:  Hypertension (ED), Toothache (ED)


Forms:  [facility] (English)

## 2018-02-03 ENCOUNTER — HOSPITAL ENCOUNTER (EMERGENCY)
Dept: HOSPITAL 14 - H.ER | Age: 38
Discharge: HOME | End: 2018-02-03
Payer: MEDICAID

## 2018-02-03 VITALS — SYSTOLIC BLOOD PRESSURE: 128 MMHG | HEART RATE: 80 BPM | DIASTOLIC BLOOD PRESSURE: 77 MMHG | RESPIRATION RATE: 19 BRPM

## 2018-02-03 VITALS — OXYGEN SATURATION: 98 %

## 2018-02-03 VITALS — TEMPERATURE: 98 F

## 2018-02-03 VITALS — BODY MASS INDEX: 38 KG/M2

## 2018-02-03 DIAGNOSIS — E11.9: ICD-10-CM

## 2018-02-03 DIAGNOSIS — I10: ICD-10-CM

## 2018-02-03 DIAGNOSIS — Z76.0: Primary | ICD-10-CM

## 2018-02-03 NOTE — ED PDOC
HPI: General Adult


Time Seen by Provider: 02/03/18 08:34


Chief Complaint (Nursing): Med Refill


Chief Complaint (Provider): Med Refill


History Per: Patient


History/Exam Limitations: no limitations


Additional Complaint(s): 


Ngozi Malik, a 37 year old female patient presents to the Emergency 

Department for refill of Metaformin. Reports she took clonidine .1mg one day 

ago and Nifedipine 30mg. Also has a history of blood pressure. Denies chest pain

, shortness of breath, fatigue, or polyuria.





PMD: Non [facility] Provider








Past Medical History


Reviewed: Historical Data, Nursing Documentation, Vital Signs


Vital Signs: 


 Last Vital Signs











Temp  97.7 F   02/03/18 08:29


 


Pulse  100 H  02/03/18 08:29


 


Resp  16   02/03/18 08:29


 


BP  182/108 H  02/03/18 08:29


 


Pulse Ox  100   02/03/18 09:03














- Medical History


PMH: Diabetes, Gastritis, HTN


   Denies: HIV, Hyperthyroidism, Migraine, Chronic Kidney Disease





- Family History


Family History: States: Unknown Family Hx





- Social History


Current smoker - smoking cessation education provided: Yes





- Home Medications


Home Medications: 


 Ambulatory Orders











 Medication  Instructions  Recorded


 


Acetaminophen [Tylenol 325mg tab] 650 mg PO Q6 PRN  tab 09/03/17


 


NIFEdipine ER [Procardia XL] 30 mg PO DAILY #30 ter 09/03/17


 


NIFEdipine ER [Procardia XL] 30 mg PO DAILY #15 tab 11/02/17


 


Azithromycin [Zithromax] 250 mg PO DAILY #6 tab 11/28/17


 


Naproxen 1 tab PO Q12 PRN #14 tab 12/18/17


 


cloNIDine [Catapres] 0.1 mg PO Q8 #90 tab 01/07/18


 


Clindamycin [Cleocin] 300 mg PO TID #30 cap 01/20/18


 


amLODIPine [Norvasc] 5 mg PO DAILY #30 tab 01/20/18


 


traMADol [Ultram] 50 mg PO Q8 #10 tab 01/20/18














- Allergies


Allergies/Adverse Reactions: 


 Allergies











Allergy/AdvReac Type Severity Reaction Status Date / Time


 


Seafood Allergy  SHORTNESS Uncoded 01/20/18 09:01





   OF BREATH  














Review of Systems


ROS Statement: Except As Marked, All Systems Reviewed And Found Negative


Constitutional: Negative for: Fever, Chills


Cardiovascular: Negative for: Chest Pain


Respiratory: Negative for: Cough, Shortness of Breath


Genitourinary Female: Positive for: Other (no polyuria)





Physical Exam





- Reviewed


Nursing Documentation Reviewed: Yes


Vital Signs Reviewed: Yes





- Physical Exam


Appears: Positive for: Well, Non-toxic, No Acute Distress


Head Exam: Positive for: ATRAUMATIC, NORMAL INSPECTION, NORMOCEPHALIC


Skin: Positive for: Normal Color, Warm, Dry


Eye Exam: Positive for: Normal appearance, EOMI, PERRL


ENT: Positive for: Normal ENT Inspection


Neck: Positive for: Normal, Painless ROM


Cardiovascular/Chest: Positive for: Regular Rate, Rhythm.  Negative for: 

Tachycardia


Respiratory: Positive for: Normal Breath Sounds.  Negative for: Respiratory 

Distress


Gastrointestinal/Abdominal: Positive for: Normal Exam


Back: Positive for: Normal Inspection


Extremity: Positive for: Normal ROM.  Negative for: Tenderness


Neurologic/Psych: Positive for: Alert, Oriented (x3), Other (copperative)





- ECG


O2 Sat by Pulse Oximetry: 100 (RA)


Pulse Ox Interpretation: Normal





Medical Decision Making


Medical Decision Making: 





Time: 8:44


Differential Diagnosis includes but is not limited to: Improvement for blood 

pressure with anticipated discharge


Initial Plan:


-- Metaformin 500 mg PO 


--Reevaluation 


--------------------------------------------------------------------------------

-----------------------


Scribe Attestation:   


Documented by Pau Rodriges, acting as a scribe for Samuel Pepe MD





Provider Scribe Attestation:


All medical record entries made by the Scribe were at my direction and 

personally dictated by me. I have reviewed the chart and agree that the record 

accurately reflects my personal performance of the history, physical exam, 

medical decision making, and the department course for this patient. I have 

also personally directed, reviewed, and agree with the discharge instructions 

and disposition.








Disposition





- Disposition


Forms:  [facility] (English)

## 2018-02-16 ENCOUNTER — HOSPITAL ENCOUNTER (EMERGENCY)
Dept: HOSPITAL 14 - H.ER | Age: 38
Discharge: HOME | End: 2018-02-16
Payer: MEDICAID

## 2018-02-16 VITALS — BODY MASS INDEX: 38 KG/M2

## 2018-02-16 VITALS
OXYGEN SATURATION: 99 % | SYSTOLIC BLOOD PRESSURE: 132 MMHG | HEART RATE: 74 BPM | DIASTOLIC BLOOD PRESSURE: 68 MMHG | TEMPERATURE: 97.9 F | RESPIRATION RATE: 18 BRPM

## 2018-02-16 DIAGNOSIS — R42: Primary | ICD-10-CM

## 2018-02-16 LAB
ALBUMIN SERPL-MCNC: 4 G/DL (ref 3.5–5)
ALBUMIN/GLOB SERPL: 1.3 {RATIO} (ref 1–2.1)
ALT SERPL-CCNC: 33 U/L (ref 9–52)
AST SERPL-CCNC: 29 U/L (ref 14–36)
BASOPHILS # BLD AUTO: 0.2 K/UL (ref 0–0.2)
BASOPHILS NFR BLD: 1.9 % (ref 0–2)
BUN SERPL-MCNC: 14 MG/DL (ref 7–17)
CALCIUM SERPL-MCNC: 8.9 MG/DL (ref 8.4–10.2)
EOSINOPHIL # BLD AUTO: 0.2 K/UL (ref 0–0.7)
EOSINOPHIL NFR BLD: 1.5 % (ref 0–4)
ERYTHROCYTE [DISTWIDTH] IN BLOOD BY AUTOMATED COUNT: 15.1 % (ref 11.5–14.5)
GFR NON-AFRICAN AMERICAN: > 60
HGB BLD-MCNC: 11.2 G/DL (ref 12–16)
LYMPHOCYTES # BLD AUTO: 3.2 K/UL (ref 1–4.3)
LYMPHOCYTES NFR BLD AUTO: 30 % (ref 20–40)
MCH RBC QN AUTO: 23.7 PG (ref 27–31)
MCHC RBC AUTO-ENTMCNC: 31.4 G/DL (ref 33–37)
MCV RBC AUTO: 75.5 FL (ref 81–99)
MONOCYTES # BLD: 0.6 K/UL (ref 0–0.8)
MONOCYTES NFR BLD: 5.5 % (ref 0–10)
NEUTROPHILS # BLD: 6.5 K/UL (ref 1.8–7)
NEUTROPHILS NFR BLD AUTO: 61.1 % (ref 50–75)
NRBC BLD AUTO-RTO: 0.1 % (ref 0–0)
PLATELET # BLD: 171 K/UL (ref 130–400)
PMV BLD AUTO: 9.7 FL (ref 7.2–11.7)
RBC # BLD AUTO: 4.73 MIL/UL (ref 3.8–5.2)
WBC # BLD AUTO: 10.7 K/UL (ref 4.8–10.8)

## 2018-02-16 PROCEDURE — 80053 COMPREHEN METABOLIC PANEL: CPT

## 2018-02-16 PROCEDURE — 85025 COMPLETE CBC W/AUTO DIFF WBC: CPT

## 2018-02-16 PROCEDURE — 81025 URINE PREGNANCY TEST: CPT

## 2018-02-16 PROCEDURE — 82948 REAGENT STRIP/BLOOD GLUCOSE: CPT

## 2018-02-16 PROCEDURE — 99285 EMERGENCY DEPT VISIT HI MDM: CPT

## 2018-02-16 PROCEDURE — 93005 ELECTROCARDIOGRAM TRACING: CPT

## 2018-02-16 NOTE — ED PDOC
Syncope/Near Syncope/Dizziness


Time Seen by Provider: 02/16/18 15:46


Chief Complaint (Nursing): Dizziness/Lightheaded


History Per: Patient


Onset/Duration Of Symptoms: Hrs (2)


Current Symptoms Are (Timing): Still Present


Seizure Or Post-ictal Symptoms: None


Severity: Mild


Additional Complaint(s): 





Dizziness assoc with nausea x 2 hrs. Denies vomiting. No chest pain or 

palpitations. No LOC.





Past Medical History


Vital Signs: 





 Last Vital Signs











Temp  98.6 F   02/16/18 15:38


 


Pulse  86   02/16/18 15:38


 


Resp  20   02/16/18 15:38


 


BP  143/82   02/16/18 15:38


 


Pulse Ox  97   02/16/18 15:38














- Medical History


PMH: Diabetes, Gastritis, HTN


   Denies: HIV, Hyperthyroidism, Migraine, Chronic Kidney Disease





- Family History


Family History: States: Unknown Family Hx





- Home Medications


Home Medications: 


 Ambulatory Orders











 Medication  Instructions  Recorded


 


Acetaminophen [Tylenol 325mg tab] 650 mg PO Q6 PRN  tab 09/03/17


 


NIFEdipine ER [Procardia XL] 30 mg PO DAILY #30 ter 09/03/17


 


NIFEdipine ER [Procardia XL] 30 mg PO DAILY #15 tab 11/02/17


 


Azithromycin [Zithromax] 250 mg PO DAILY #6 tab 11/28/17


 


Naproxen 1 tab PO Q12 PRN #14 tab 12/18/17


 


cloNIDine [Catapres] 0.1 mg PO Q8 #90 tab 01/07/18


 


Clindamycin [Cleocin] 300 mg PO TID #30 cap 01/20/18


 


amLODIPine [Norvasc] 5 mg PO DAILY #30 tab 01/20/18


 


traMADol [Ultram] 50 mg PO Q8 #10 tab 01/20/18


 


metFORMIN [glucOPHAGE] 500 mg PO BID #30 tab 02/03/18














- Allergies


Allergies/Adverse Reactions: 


 Allergies











Allergy/AdvReac Type Severity Reaction Status Date / Time


 


Seafood Allergy  SHORTNESS Uncoded 01/20/18 09:01





   OF BREATH  














Review of Systems


ROS Statement: Except As Marked, All Systems Reviewed And Found Negative


Cardiovascular: Negative for: Chest Pain, Palpitations


Gastrointestinal: Positive for: Nausea


Neurological: Positive for: Dizziness





Physical Exam





- Reviewed


Nursing Documentation Reviewed: Yes


Vital Signs Reviewed: Yes





- Physical Exam


Appears: Positive for: Non-toxic, No Acute Distress


Head Exam: Positive for: ATRAUMATIC, NORMAL INSPECTION, NORMOCEPHALIC


Skin: Positive for: Normal Color, Warm, DRY


Eye Exam: Positive for: EOMI, Normal appearance, PERRL


ENT: Positive for: Normal ENT Inspection


Neck: Positive for: Normal, Painless ROM


Cardiovascular/Chest: Positive for: Regular Rate, Rhythm


Respiratory: Positive for: CNT, Normal Breath Sounds


Gastrointestinal/Abdominal: Positive for: Normal Exam, Bowel Sounds, Soft


Back: Positive for: Normal Inspection


Extremity: Positive for: Normal ROM


Neurologic/Psych: Positive for: Alert, Oriented





- Laboratory Results


Result Diagrams: 


 02/16/18 16:46





 02/16/18 16:46





- ECG


O2 Sat by Pulse Oximetry: 97





Disposition





- Clinical Impression


Clinical Impression: 


 Dizziness








- Patient ED Disposition


Is Patient to be Admitted: Transfer of Care





- Disposition


Disposition: Transfer of Care


Disposition Time: 19:06


Condition: FAIR


Forms:  [facility] (English)


Patient Signed Over To: Abimael Vazquez

## 2018-02-17 NOTE — CARD
--------------- APPROVED REPORT --------------





EKG Measurement

Heart [id number]

WV [id number]

[id number]

[id number]

[id number]



<Conclusion>

Normal sinus rhythm

Nonspecific T wave abnormality

Abnormal ECG

## 2018-02-23 ENCOUNTER — HOSPITAL ENCOUNTER (EMERGENCY)
Dept: HOSPITAL 14 - H.ER | Age: 38
Discharge: HOME | End: 2018-02-23
Payer: MEDICAID

## 2018-02-23 VITALS
DIASTOLIC BLOOD PRESSURE: 94 MMHG | OXYGEN SATURATION: 98 % | RESPIRATION RATE: 18 BRPM | SYSTOLIC BLOOD PRESSURE: 168 MMHG

## 2018-02-23 VITALS — BODY MASS INDEX: 39.4 KG/M2

## 2018-02-23 VITALS — TEMPERATURE: 98.1 F

## 2018-02-23 VITALS — HEART RATE: 74 BPM

## 2018-02-23 DIAGNOSIS — Z79.84: ICD-10-CM

## 2018-02-23 DIAGNOSIS — I10: ICD-10-CM
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
## 2018-02-23 DIAGNOSIS — F17.210: Primary | ICD-10-CM

## 2018-02-23 DIAGNOSIS — E11.9: ICD-10-CM

## 2018-02-23 NOTE — ED PDOC
Lower Extremity Pain/Injury


Time Seen by Provider: 02/23/18 08:12


Chief Complaint (Nursing): Lower Extremity Problem/Injury


History Per: Patient


Onset/Duration Of Symptoms: Other (2 months)


Current Symptoms Are (Timing): Still Present


Severity: Moderate


Additional Complaint(s): 





Pain left foot and ankle x 2 months. Injured left foot when falling 2 months 

ago Has pain on ambulation. H/o HTN and DM





Past Medical History


Vital Signs: 





 Last Vital Signs











Temp  98.1 F   02/23/18 08:03


 


Pulse  83   02/23/18 08:03


 


Resp  20   02/23/18 08:03


 


BP  171/115 H  02/23/18 08:03


 


Pulse Ox  99   02/23/18 08:03














- Medical History


PMH: Diabetes, Gastritis, HTN


   Denies: HIV, Hyperthyroidism, Migraine, Chronic Kidney Disease





- Family History


Family History: States: Unknown Family Hx





- Home Medications


Home Medications: 


 Ambulatory Orders











 Medication  Instructions  Recorded


 


Acetaminophen [Tylenol 325mg tab] 650 mg PO Q6 PRN  tab 09/03/17


 


NIFEdipine ER [Procardia XL] 30 mg PO DAILY #30 ter 09/03/17


 


NIFEdipine ER [Procardia XL] 30 mg PO DAILY #15 tab 11/02/17


 


Azithromycin [Zithromax] 250 mg PO DAILY #6 tab 11/28/17


 


Naproxen 1 tab PO Q12 PRN #14 tab 12/18/17


 


cloNIDine [Catapres] 0.1 mg PO Q8 #90 tab 01/07/18


 


Clindamycin [Cleocin] 300 mg PO TID #30 cap 01/20/18


 


amLODIPine [Norvasc] 5 mg PO DAILY #30 tab 01/20/18


 


traMADol [Ultram] 50 mg PO Q8 #10 tab 01/20/18


 


metFORMIN [glucOPHAGE] 500 mg PO BID #30 tab 02/03/18


 


Meclizine [Meclizine*] 25 mg PO Q8 #15 tab 02/16/18


 


traMADol [Ultram] 50 mg PO Q8 #10 tab 02/23/18














- Allergies


Allergies/Adverse Reactions: 


 Allergies











Allergy/AdvReac Type Severity Reaction Status Date / Time


 


Seafood Allergy  SHORTNESS Uncoded 02/23/18 08:00





   OF BREATH  














Review of Systems


Cardiovascular: Negative for: Chest Pain


Respiratory: Negative for: Shortness of Breath


Musculoskeletal: Positive for: Foot Pain, Other (ankle pain)





Physical Exam





- Physical Exam


Appears: Positive for: Non-toxic, No Acute Distress


Skin: Positive for: Normal Color, Warm, DRY


Cardiovascular/Chest: Positive for: Regular Rate, Rhythm


Respiratory: Positive for: Normal Breath Sounds


Extremity: Positive for: Other (Left heel tenderness medially. Tenderness 

medial maleolus. No deformity or swelling)


Neurologic/Psych: Positive for: Alert, Oriented.  Negative for: Motor/Sensory 

Deficits





- ECG


O2 Sat by Pulse Oximetry: 99





Disposition





- Clinical Impression


Clinical Impression: 


 Foot fracture








- Patient ED Disposition


Is Patient to be Admitted: No


Counseled Patient/Family Regarding: Studies Performed, Diagnosis, Need For 

Followup, Rx Given





- Disposition


Referrals: 


Podiatry Clinic [Outside]


Disposition: Routine/Home


Disposition Time: 12:07


Condition: FAIR


Prescriptions: 


traMADol [Ultram] 50 mg PO Q8 #10 tab


Instructions:  Foot Fracture (DC)


Forms:  [facility] (English)

## 2018-02-23 NOTE — RAD
PROCEDURE:  Left Ankle Radiographs.



HISTORY:

pain  



COMPARISON:

None



FINDINGS:



BONES:

Bone alignment and mineralization are normal.  There is no acute 

displaced fracture or bone destruction.  There is a prominent plantar 

calcaneal spur. 



JOINTS:

Normal. No osteoarthritis. Ankle mortise maintained. Talar dome intact



SOFT TISSUES:

There is mild medial soft tissue swelling. 



OTHER FINDINGS:

None.



IMPRESSION:

No acute fracture or dislocation.

## 2018-02-23 NOTE — RAD
PROCEDURE:  Left Foot Radiographs.



HISTORY:

pain  



COMPARISON:

None.



FINDINGS:



BONES:

Bone alignment and mineralization are normal.  There is a linear os 

ossific density posterior medial to the navicular. 



JOINTS:

Normal. 



SOFT TISSUES:

There is diffuse soft tissue swelling in the foot. 



OTHER FINDINGS:

None.



IMPRESSION:

Linear ossific density posterior medial to the navicular could 

represent the fracture fragment. Diffuse soft tissue swelling in the 

foot. Correlation with point tenderness and clinical follow-up 

advised.

## 2018-03-22 ENCOUNTER — HOSPITAL ENCOUNTER (EMERGENCY)
Dept: HOSPITAL 14 - H.ER | Age: 38
LOS: 1 days | Discharge: LEFT BEFORE DISCHARGE | End: 2018-03-23
Payer: MEDICAID

## 2018-03-22 VITALS — BODY MASS INDEX: 39.4 KG/M2

## 2018-03-22 DIAGNOSIS — E11.9: ICD-10-CM

## 2018-03-22 DIAGNOSIS — J45.901: ICD-10-CM

## 2018-03-22 DIAGNOSIS — F17.200: ICD-10-CM

## 2018-03-22 DIAGNOSIS — Z79.84: ICD-10-CM

## 2018-03-22 DIAGNOSIS — I10: ICD-10-CM

## 2018-03-22 DIAGNOSIS — M79.604: ICD-10-CM

## 2018-03-22 DIAGNOSIS — R06.02: Primary | ICD-10-CM

## 2018-03-22 PROCEDURE — 85025 COMPLETE CBC W/AUTO DIFF WBC: CPT

## 2018-03-22 PROCEDURE — 96372 THER/PROPH/DIAG INJ SC/IM: CPT

## 2018-03-22 PROCEDURE — 80053 COMPREHEN METABOLIC PANEL: CPT

## 2018-03-22 PROCEDURE — 99283 EMERGENCY DEPT VISIT LOW MDM: CPT

## 2018-03-23 VITALS
OXYGEN SATURATION: 96 % | TEMPERATURE: 98.8 F | SYSTOLIC BLOOD PRESSURE: 176 MMHG | DIASTOLIC BLOOD PRESSURE: 100 MMHG | RESPIRATION RATE: 20 BRPM

## 2018-03-23 VITALS — HEART RATE: 112 BPM

## 2018-03-23 LAB
ALBUMIN SERPL-MCNC: 4.6 G/DL (ref 3.5–5)
ALBUMIN/GLOB SERPL: 1.2 {RATIO} (ref 1–2.1)
ALT SERPL-CCNC: 34 U/L (ref 9–52)
AST SERPL-CCNC: 39 U/L (ref 14–36)
BASOPHILS # BLD AUTO: 0.1 K/UL (ref 0–0.2)
BASOPHILS NFR BLD: 0.8 % (ref 0–2)
BUN SERPL-MCNC: 8 MG/DL (ref 7–17)
CALCIUM SERPL-MCNC: 8.8 MG/DL (ref 8.4–10.2)
EOSINOPHIL # BLD AUTO: 0.2 K/UL (ref 0–0.7)
EOSINOPHIL NFR BLD: 2.2 % (ref 0–4)
ERYTHROCYTE [DISTWIDTH] IN BLOOD BY AUTOMATED COUNT: 15.2 % (ref 11.5–14.5)
GFR NON-AFRICAN AMERICAN: > 60
HGB BLD-MCNC: 12.1 G/DL (ref 12–16)
LYMPHOCYTES # BLD AUTO: 1.3 K/UL (ref 1–4.3)
LYMPHOCYTES NFR BLD AUTO: 17.7 % (ref 20–40)
MCH RBC QN AUTO: 24.5 PG (ref 27–31)
MCHC RBC AUTO-ENTMCNC: 32.7 G/DL (ref 33–37)
MCV RBC AUTO: 75.1 FL (ref 81–99)
MONOCYTES # BLD: 0.4 K/UL (ref 0–0.8)
MONOCYTES NFR BLD: 5.5 % (ref 0–10)
NEUTROPHILS # BLD: 5.5 K/UL (ref 1.8–7)
NEUTROPHILS NFR BLD AUTO: 73.8 % (ref 50–75)
NRBC BLD AUTO-RTO: 0.1 % (ref 0–0)
PLATELET # BLD: 241 K/UL (ref 130–400)
PMV BLD AUTO: 9.5 FL (ref 7.2–11.7)
RBC # BLD AUTO: 4.95 MIL/UL (ref 3.8–5.2)
WBC # BLD AUTO: 7.4 K/UL (ref 4.8–10.8)

## 2018-03-23 NOTE — ED PDOC
HPI: General Adult


Time Seen by Provider: 18 01:01


Chief Complaint (Nursing): Shortness Of Breath


Chief Complaint (Provider): SOB, Leg Pain


History Per: Patient


History/Exam Limitations: no limitations


Onset/Duration Of Symptoms: Hrs (x1.5)


Have you had recent travel within the past 21 days to any of the following 

countries: [place], [place], [place] or [place]?: No


Current Symptoms Are (Timing): Still Present


Additional Complaint(s): 


Patient is a 38 year old female who presents to ED reporting SOB and right leg 

pain for the past 1.5 hours. Patient states the SOB was sudden in onset and 

constant, associated with dry cough. She states her right leg pain originates 

in the hip and radiates down to her toes and worsens with movement. She denies 

any history of injury to the right lower extremity. Patient took no medications 

prior to arrival. Patient denies: chest pain, N/V/D, abdominal pain, numbness/

weakness, fever, chills, leg swelling, calf tenderness, birth control use, 

prolonged immobility, saddle anesthesia, incontinence, urinary symptoms, flank 

pain.





PMD: Sham 


LMP: 3/19/18





Against Medical Advice





- AMA


Patient Left Against Medical Advice: 


The patient declines admission to the hospital and wishes to leave the 

Emergency Department.  This action is against my medical advice. This decision 

was made with informed refusal. The patient was told that admission to the 

hospital is necessary.  Explanation of the reasons why were discussed.  


The risks of leaving were explained to the patient and include, but are not 

limited to, worsening of known or currently unknown conditions, permanent 

disability and death from undiagnosed or untreated conditions. 


The patient has the capacity to make this informed decision and understands my 

explanation of the current medical problem and risks of leaving. 


The patient voluntarily accepts these risks and signed an AMA form documenting 

our conversation.


The patient was given the opportunity to ask questions and reconsider.  The 

patient was encouraged to return to the Emergency Department at any time for 

further care.





Patient did not refuse admission but refused IV access and CT evaluation. 

Patient left ED prior to signing AMA paperwork





Past Medical History


Reviewed: Historical Data, Nursing Documentation, Vital Signs


Vital Signs: 


 Last Vital Signs











Temp  98.8 F   18 00:09


 


Pulse  112 H  18 03:31


 


Resp  20   18 00:09


 


BP  176/100 H  18 00:09


 


Pulse Ox  96   18 06:38














- Medical History


PMH: Asthma, Diabetes, Gastritis, HTN


   Denies: HIV, Hyperthyroidism, Migraine, Chronic Kidney Disease





- Family History


Family History: States: Unknown Family Hx





- Social History


Current smoker - smoking cessation education provided: Yes (4 cigs/day)


Alcohol: None


Drugs: Denies





- Home Medications


Home Medications: 


 Ambulatory Orders











 Medication  Instructions  Recorded


 


Acetaminophen [Tylenol 325mg tab] 650 mg PO Q6 PRN  tab 17


 


NIFEdipine ER [Procardia XL] 30 mg PO DAILY #30 ter 17


 


NIFEdipine ER [Procardia XL] 30 mg PO DAILY #15 tab 17


 


Azithromycin [Zithromax] 250 mg PO DAILY #6 tab 17


 


Naproxen 1 tab PO Q12 PRN #14 tab 17


 


cloNIDine [Catapres] 0.1 mg PO Q8 #90 tab 18


 


Clindamycin [Cleocin] 300 mg PO TID #30 cap 18


 


amLODIPine [Norvasc] 5 mg PO DAILY #30 tab 18


 


traMADol [Ultram] 50 mg PO Q8 #10 tab 18


 


metFORMIN [glucOPHAGE] 500 mg PO BID #30 tab 18


 


Meclizine [Meclizine*] 25 mg PO Q8 #15 tab 18


 


traMADol [Ultram] 50 mg PO Q8 #10 tab 18














- Allergies


Allergies/Adverse Reactions: 


 Allergies











Allergy/AdvReac Type Severity Reaction Status Date / Time


 


Seafood Allergy  SHORTNESS Uncoded 18 00:09





   OF BREATH  














Review of Systems


ROS Statement: Except As Marked, All Systems Reviewed And Found Negative


Respiratory: Positive for: Cough, Shortness of Breath


Musculoskeletal: Positive for: Leg Pain (right)





Physical Exam





- Reviewed


Nursing Documentation Reviewed: Yes


Vital Signs Reviewed: Yes





- Physical Exam


Appears: Positive for: Well (obese), Non-toxic, No Acute Distress


Head Exam: Positive for: ATRAUMATIC, NORMOCEPHALIC


Skin: Positive for: Normal Color, Warm, Dry


Eye Exam: Positive for: EOMI, PERRL.  Negative for: Nystagmus, Conjunctival 

injection


ENT: Positive for: Pharynx Is (clear, uvula midline), TM Is/Are (nonbulging, 

nonerythematous bilaterally).  Negative for: Nasal Congestion, Pharyngeal 

Erythema


Neck: Positive for: Painless ROM, Supple


Cardiovascular/Chest: Positive for: Regular Rate, Rhythm, Tachycardia.  

Negative for: Chest Non Tender


Respiratory: Positive for: Decreased Breath Sounds (bilaterally), Wheezing (

faint inspiratory wheezing ), Other (respirations nonlabored, speaking in full 

sentences).  Negative for: Accessory Muscle Use, Stridor, Respiratory Distress


Gastrointestinal/Abdominal: Positive for: Soft.  Negative for: Tenderness, 

Distended, Guarding


Extremity: Positive for: Normal ROM (throughout right lower extremity), 

Capillary Refill (<2 seconds).  Negative for: Tenderness, Pedal Edema, Calf 

Tenderness, Deformity, Swelling


Neurologic/Psych: Positive for: Alert, Oriented (x3), Gait (steady in ED).  

Negative for: Motor/Sensory Deficits, Aphasia, Facial Droop





- Laboratory Results


Result Diagrams: 


 18 04:15





- ECG


O2 Sat by Pulse Oximetry: 96 (RA)


Pulse Ox Interpretation: Normal





Medical Decision Making


Medical Decision Makin:39


Initial Impression: Asthma exacerbation, SOB, leg pain


Plan:


-Duoneb x2


-Albuterol x1


-Prednisone 60mg PO


-Upreg


-Toradol IM


-Re-eval





0225


Patient reports minimal improvement of symptoms. Patient noted to be slightly 

tachycardic, likely due to nebulizer treatments. Pending repeat vitals.





0300


Patient with persistent tachycardia >110bpm.


Patient states SOB has continued to improve slightly.





0315


Case discussed with ED MD Vazquez.


Due to persistent tachycardia and risk factors of smoking and obesity for PE, 

CT chest ordered along with labs and IV access.





0328


Patient requesting to leave AMA at this time. Patient eloped prior to signing 

AMA form. 





Disposition





- Clinical Impression


Clinical Impression: 


 SOB (shortness of breath), Leg pain, right








- Patient ED Disposition


Is Patient to be Admitted: No





- Disposition


Referrals: 


Missael Tate MD [Primary Care Provider] - 


Disposition: Against Medical Advice (patient left ED prior to signing AMA 

paperwork)


Disposition Time: 03:28


Condition: FAIR


Forms:  [facility] (English)


Print Language: ENGLISH





Results





- Lab Results


Lab Results: 

















  18





  04:15


 


Sodium  140


 


Potassium  3.9


 


Chloride  101


 


Carbon Dioxide  24


 


Anion Gap  19


 


BUN  8


 


Creatinine  0.8


 


Est GFR ( Amer)  > 60


 


Est GFR (Non-Af Amer)  > 60


 


Random Glucose  220 H


 


Calcium  8.8


 


Total Bilirubin  1.0


 


AST  39 H D


 


ALT  34


 


Alkaline Phosphatase  67


 


Total Protein  8.6 H


 


Albumin  4.6


 


Globulin  4.0 H


 


Albumin/Globulin Ratio  1.2

## 2018-03-24 ENCOUNTER — HOSPITAL ENCOUNTER (INPATIENT)
Dept: HOSPITAL 14 - H.ER | Age: 38
LOS: 4 days | Discharge: LEFT BEFORE DISCHARGE | DRG: 90 | End: 2018-03-28
Attending: INTERNAL MEDICINE | Admitting: INTERNAL MEDICINE
Payer: MEDICAID

## 2018-03-24 VITALS — BODY MASS INDEX: 39.4 KG/M2

## 2018-03-24 DIAGNOSIS — Z91.013: ICD-10-CM

## 2018-03-24 DIAGNOSIS — E66.9: ICD-10-CM

## 2018-03-24 DIAGNOSIS — K29.70: ICD-10-CM

## 2018-03-24 DIAGNOSIS — J18.9: Primary | ICD-10-CM

## 2018-03-24 DIAGNOSIS — F17.210: ICD-10-CM

## 2018-03-24 DIAGNOSIS — Z59.0: ICD-10-CM

## 2018-03-24 DIAGNOSIS — E11.9: ICD-10-CM

## 2018-03-24 DIAGNOSIS — I10: ICD-10-CM

## 2018-03-24 DIAGNOSIS — Z91.14: ICD-10-CM

## 2018-03-24 DIAGNOSIS — J45.901: ICD-10-CM

## 2018-03-24 SDOH — ECONOMIC STABILITY - HOUSING INSECURITY: HOMELESSNESS: Z59.0

## 2018-03-25 LAB
ALBUMIN SERPL-MCNC: 4.1 G/DL (ref 3.5–5)
ALBUMIN/GLOB SERPL: 1.2 {RATIO} (ref 1–2.1)
ALT SERPL-CCNC: 31 U/L (ref 9–52)
AST SERPL-CCNC: 23 U/L (ref 14–36)
BASOPHILS # BLD AUTO: 0.1 K/UL (ref 0–0.2)
BASOPHILS NFR BLD: 0.7 % (ref 0–2)
BNP SERPL-MCNC: 69.7 PG/ML (ref 0–450)
BUN SERPL-MCNC: 14 MG/DL (ref 7–17)
CALCIUM SERPL-MCNC: 9 MG/DL (ref 8.4–10.2)
EOSINOPHIL # BLD AUTO: 0.2 K/UL (ref 0–0.7)
EOSINOPHIL NFR BLD: 2.3 % (ref 0–4)
ERYTHROCYTE [DISTWIDTH] IN BLOOD BY AUTOMATED COUNT: 15.7 % (ref 11.5–14.5)
GFR NON-AFRICAN AMERICAN: > 60
HGB BLD-MCNC: 11.9 G/DL (ref 12–16)
LYMPHOCYTES # BLD AUTO: 1.7 K/UL (ref 1–4.3)
LYMPHOCYTES NFR BLD AUTO: 18 % (ref 20–40)
MCH RBC QN AUTO: 24.4 PG (ref 27–31)
MCHC RBC AUTO-ENTMCNC: 32.3 G/DL (ref 33–37)
MCV RBC AUTO: 75.5 FL (ref 81–99)
MONOCYTES # BLD: 0.3 K/UL (ref 0–0.8)
MONOCYTES NFR BLD: 3.8 % (ref 0–10)
NEUTROPHILS # BLD: 6.9 K/UL (ref 1.8–7)
NEUTROPHILS NFR BLD AUTO: 75.2 % (ref 50–75)
NRBC BLD AUTO-RTO: 0.1 % (ref 0–0)
PLATELET # BLD: 195 K/UL (ref 130–400)
PMV BLD AUTO: 9.7 FL (ref 7.2–11.7)
RBC # BLD AUTO: 4.88 MIL/UL (ref 3.8–5.2)
WBC # BLD AUTO: 9.2 K/UL (ref 4.8–10.8)

## 2018-03-25 RX ADMIN — INSULIN LISPRO SCH: 100 INJECTION, SOLUTION INTRAVENOUS; SUBCUTANEOUS at 22:01

## 2018-03-25 RX ADMIN — INSULIN LISPRO SCH: 100 INJECTION, SOLUTION INTRAVENOUS; SUBCUTANEOUS at 16:49

## 2018-03-25 RX ADMIN — IPRATROPIUM BROMIDE AND ALBUTEROL SULFATE SCH ML: .5; 3 SOLUTION RESPIRATORY (INHALATION) at 19:50

## 2018-03-25 RX ADMIN — INSULIN LISPRO SCH: 100 INJECTION, SOLUTION INTRAVENOUS; SUBCUTANEOUS at 12:18

## 2018-03-25 RX ADMIN — INSULIN LISPRO SCH UNITS: 100 INJECTION, SOLUTION INTRAVENOUS; SUBCUTANEOUS at 12:14

## 2018-03-25 RX ADMIN — IPRATROPIUM BROMIDE AND ALBUTEROL SULFATE SCH ML: .5; 3 SOLUTION RESPIRATORY (INHALATION) at 10:18

## 2018-03-25 RX ADMIN — IPRATROPIUM BROMIDE AND ALBUTEROL SULFATE SCH ML: .5; 3 SOLUTION RESPIRATORY (INHALATION) at 13:26

## 2018-03-25 RX ADMIN — LEVOFLOXACIN SCH MLS/HR: 5 INJECTION, SOLUTION INTRAVENOUS at 22:00

## 2018-03-25 NOTE — HP
HISTORY OF PRESENT ILLNESS:  This is a 38-year-old  female

with history of type 2 diabetes mellitus and hypertension, not compliant to

medications, presented to emergency room with complaint of chest pain onset

for 4 days.  The patient symptoms were not associated with flu-like

symptoms, shortness of breath, and wheezing.  The patient was given DuoNeb

treatment in the emergency room and was advised to have a CTA of the chest.

The patient refuses CT and signed against medical advice.  The patient came

back to emergency room and reports worsening of her symptoms and agreed for

the CT angiogram.  The patient was found to have bilateral pneumonia. 

Subsequently, she was started on IV antibiotics and admitted for further

management.  The patient lives in a shelter and other review of system is

positive for lower back pain that radiates to right lower extremity.



ALLERGIES:  THERE IS ALLERGY TO SEAFOOD.



HOME MEDICATIONS:  Metformin 500 mg twice a day, nifedipine ER 60 mg daily,

and bisoprolol 5 mg daily.



SOCIAL HISTORY:  Positive for smoker.  Denied EtOH or substance abuse.



FAMILY HISTORY:  Noncontributory.



PHYSICAL EXAMINATION:

GENERAL:  The patient is in bed, comfortable at the time of this

examination with mild wheezing.

VITAL SIGNS:  Blood pressure is 147/83, temperature 98.3, respiratory rate

20, and pulse 90.

HEENT:  Pupils equal and reactive to light.  Normal-appearing mucosa of the

conjunctivae, oropharynx, and nasal membrane mucosa.

NECK:  Supple.  No JVD.  No carotid bruit.  No lymph node.  No thyromegaly.

CHEST AND LUNGS:  Bilateral symmetrical expansion.  Few bilateral fine

rales.

CARDIOVASCULAR:  PMI not localized.  S1 and S2.  No additional sounds.

ABDOMEN:  Normoactive bowel sounds.  No tenderness.  No organomegaly.  No

masses.

EXTREMITIES:  No cyanosis.  No clubbing.  No edema.

CENTRAL NERVOUS SYSTEM:  Alert, awake, and oriented x2.  No neurological

deficit could be appreciated.



ASSESSMENT:

1.  Community-acquired pneumonia.

2.  Type 2 diabetes mellitus.

3.  Hypertension.



















PLAN:  Continue Levaquin 750 mg daily.  We will give the patient DuoNeb as

needed, metformin, Januvia and nifedipine with bisoprolol to treat the

hypertension.  We will do also x-ray of the lumbar spine.





__________________________________________

Sameh MD Bebeto





DD:  03/25/2018 20:21:26

DT:  03/25/2018 20:23:52

[facility] # [id number]

## 2018-03-25 NOTE — CT
EXAM:

  CT Angiography Chest Mip With Intravenous Contrast



EXAM DATE/TIME:

  3/24/2018 10:59 PM



CLINICAL HISTORY:

  38 years old, female; Signs and symptoms; Other: Chest pain; Additional info: 

Chest pain R/O pe



TECHNIQUE:

  Axial computed tomographic angiography images of the chest mip with 

intravenous contrast.  All CT scans at this facility use one or more dose 

reduction techniques, viz.: automated exposure control; ma/kV adjustment per 

patient size (including targeted exams where dose is matched to indication; 

i.e. head); or iterative reconstruction technique.

  MIP reconstructed images were created and reviewed.

  Coronal and sagittal reformatted images were created and reviewed.



CONTRAST:

  95 mL of VISIPAQUE-320 administered intravenously.



COMPARISON:

  No relevant prior studies available.



FINDINGS:



  No pulmonary embolism. 



  No aortic dissection or aneurysm. 



  No pleural or pericardial effussions. 



  A small focal area of pleural thickening is noted in the lateral left lung.



  There are small scattered hazy opacities bilaterally.



  There are areas of consolidation in the lingula and right midlung.



  Slight asymmetry of the thyroid lobes being more prominent on the left.



  Splenic calcifications.



  



IMPRESSION:    



    Patchy hazy opacities bilaterally with areas of consolidation in the mid 

lungs likely representing a combination of infectious infiltrates and 

atelectasis. Followup is recommended to ensure complete resolution.

## 2018-03-25 NOTE — CARD
--------------- APPROVED REPORT --------------





EKG Measurement

Heart [id number]

IN 124P61

[id number]

[id number]

[id number]



<Conclusion>

Sinus tachycardia

Biatrial enlargement

Nonspecific ST and T wave abnormality

Abnormal ECG

## 2018-03-26 RX ADMIN — INSULIN LISPRO SCH: 100 INJECTION, SOLUTION INTRAVENOUS; SUBCUTANEOUS at 12:53

## 2018-03-26 RX ADMIN — IPRATROPIUM BROMIDE AND ALBUTEROL SULFATE SCH ML: .5; 3 SOLUTION RESPIRATORY (INHALATION) at 01:00

## 2018-03-26 RX ADMIN — IPRATROPIUM BROMIDE AND ALBUTEROL SULFATE SCH ML: .5; 3 SOLUTION RESPIRATORY (INHALATION) at 07:48

## 2018-03-26 RX ADMIN — INSULIN LISPRO SCH: 100 INJECTION, SOLUTION INTRAVENOUS; SUBCUTANEOUS at 08:43

## 2018-03-26 RX ADMIN — IPRATROPIUM BROMIDE AND ALBUTEROL SULFATE SCH ML: .5; 3 SOLUTION RESPIRATORY (INHALATION) at 13:27

## 2018-03-26 RX ADMIN — LEVOFLOXACIN SCH MLS/HR: 5 INJECTION, SOLUTION INTRAVENOUS at 20:56

## 2018-03-26 RX ADMIN — INSULIN LISPRO SCH: 100 INJECTION, SOLUTION INTRAVENOUS; SUBCUTANEOUS at 16:35

## 2018-03-26 RX ADMIN — IPRATROPIUM BROMIDE AND ALBUTEROL SULFATE SCH ML: .5; 3 SOLUTION RESPIRATORY (INHALATION) at 19:33

## 2018-03-26 RX ADMIN — INSULIN LISPRO SCH: 100 INJECTION, SOLUTION INTRAVENOUS; SUBCUTANEOUS at 22:30

## 2018-03-26 NOTE — PN
DATE:  03/26/2018



SUBJECTIVE:  She has less chest tightness and cough.



OBJECTIVE:

VITAL SIGNS:  Blood pressure is 142/92, temperature 98.1, respiratory rate

18 and pulse 77.

HEENT:  Pupils equal, reactive to light.  Normal-appearing mucosa of the

conjunctivae, oropharynx and nasal membrane mucosa.

NECK:  Supple.  No JVD.  No carotid bruit.  No lymph node.  No thyromegaly.

CHEST/LUNGS:  Bilateral symmetrical expansion, good air exchange.  No

rales, no rhonchi.

CARDIOVASCULAR:  System PMI not localized.  S1 and S2.  No additional

sounds.

ABDOMEN:  Normoactive bowel sounds.  No tenderness.  No organomegaly.  No

masses.

EXTREMITIES:  No cyanosis, clubbing, no edema.

CNS:  Alert, awake, oriented x2.  Neurological deficit could be

appreciated.



ASSESSMENT:  Pneumonia, hypertension, type 2 diabetes mellitus, smoker.



PLAN:  Continue current antibiotics and Accu-Cheks with insulin coverage

and we will follow the x-ray of the lumbar spine ordered.





__________________________________________

Missael Tate MD



DD:  03/26/2018 20:26:22

DT:  03/26/2018 20:27:48

[facility] # [id number]

## 2018-03-27 LAB
BUN SERPL-MCNC: 18 MG/DL (ref 7–17)
CALCIUM SERPL-MCNC: 9.9 MG/DL (ref 8.4–10.2)
ERYTHROCYTE [DISTWIDTH] IN BLOOD BY AUTOMATED COUNT: 15.8 % (ref 11.5–14.5)
GFR NON-AFRICAN AMERICAN: 55
HGB BLD-MCNC: 13.9 G/DL (ref 12–16)
MCH RBC QN AUTO: 24.1 PG (ref 27–31)
MCHC RBC AUTO-ENTMCNC: 31.6 G/DL (ref 33–37)
MCV RBC AUTO: 76.3 FL (ref 81–99)
PLATELET # BLD: 213 K/UL (ref 130–400)
RBC # BLD AUTO: 5.77 MIL/UL (ref 3.8–5.2)
WBC # BLD AUTO: 14.5 K/UL (ref 4.8–10.8)

## 2018-03-27 RX ADMIN — INSULIN LISPRO SCH: 100 INJECTION, SOLUTION INTRAVENOUS; SUBCUTANEOUS at 11:30

## 2018-03-27 RX ADMIN — IPRATROPIUM BROMIDE AND ALBUTEROL SULFATE SCH ML: .5; 3 SOLUTION RESPIRATORY (INHALATION) at 19:10

## 2018-03-27 RX ADMIN — IPRATROPIUM BROMIDE AND ALBUTEROL SULFATE SCH ML: .5; 3 SOLUTION RESPIRATORY (INHALATION) at 01:01

## 2018-03-27 RX ADMIN — LEVOFLOXACIN SCH MLS/HR: 5 INJECTION, SOLUTION INTRAVENOUS at 21:28

## 2018-03-27 RX ADMIN — INSULIN LISPRO SCH: 100 INJECTION, SOLUTION INTRAVENOUS; SUBCUTANEOUS at 17:33

## 2018-03-27 RX ADMIN — INSULIN LISPRO SCH: 100 INJECTION, SOLUTION INTRAVENOUS; SUBCUTANEOUS at 07:30

## 2018-03-27 RX ADMIN — METHYLPREDNISOLONE SODIUM SUCCINATE SCH MG: 40 INJECTION, POWDER, FOR SOLUTION INTRAMUSCULAR; INTRAVENOUS at 14:26

## 2018-03-27 NOTE — PN
DAILY PROGRESS NOTE



DATE:  03/27/2018



SUBJECTIVE:  The patient is seen today, 03/27/2018.  Decreased shortness of

breath and decreased cough.



PHYSICAL EXAMINATION:

VITAL SIGNS:  Blood pressure 130/76, temperature 97.5, respiratory rate 18,

and pulse 84.

HEENT:  Pupils equal and reactive to light.  Normal-appearing mucosa of the

conjunctivae.

NECK:  Supple.  No JVD.  No carotid bruit.  No lymph node.  No thyromegaly.

CHEST AND LUNGS:  Bilateral symmetrical expansion.  Good air exchange.  No

rales.  No rhonchi.

CARDIOVASCULAR:  PMI not localized.  S1 and S2.  No additional sounds.

ABDOMEN:  Normoactive bowel sounds.  No tenderness.  No organomegaly.  No

masses.

EXTREMITIES:  No cyanosis.  No clubbing.  No edema.

CENTRAL NERVOUS SYSTEM:  Alert, awake, and oriented x2.  No neurological

deficits could be appreciated.



ASSESSMENT:

1.  Pneumonia.

2.  Exacerbation of bronchial asthma.

3.  Hypertension.

4.  Type 2 diabetes mellitus.



PLAN:  Continue current IV antibiotics and medications and follow results

of the lumbar spine x-ray.  We will start the patient on Solu-Medrol 20 mg

IV every 8 hours.





__________________________________________

Msisael Tate MD





DD:  03/27/2018 22:19:44

DT:  03/27/2018 22:45:45

Job # [id number]

## 2018-03-28 RX ADMIN — METHYLPREDNISOLONE SODIUM SUCCINATE SCH MG: 40 INJECTION, POWDER, FOR SOLUTION INTRAMUSCULAR; INTRAVENOUS at 13:03

## 2018-03-28 RX ADMIN — IPRATROPIUM BROMIDE AND ALBUTEROL SULFATE SCH ML: .5; 3 SOLUTION RESPIRATORY (INHALATION) at 13:18

## 2018-03-28 RX ADMIN — INSULIN LISPRO SCH: 100 INJECTION, SOLUTION INTRAVENOUS; SUBCUTANEOUS at 00:12

## 2018-03-28 RX ADMIN — INSULIN LISPRO SCH: 100 INJECTION, SOLUTION INTRAVENOUS; SUBCUTANEOUS at 17:01

## 2018-03-28 RX ADMIN — INSULIN LISPRO SCH: 100 INJECTION, SOLUTION INTRAVENOUS; SUBCUTANEOUS at 13:00

## 2018-03-28 RX ADMIN — INSULIN LISPRO SCH: 100 INJECTION, SOLUTION INTRAVENOUS; SUBCUTANEOUS at 09:07

## 2018-03-28 RX ADMIN — METHYLPREDNISOLONE SODIUM SUCCINATE SCH: 40 INJECTION, POWDER, FOR SOLUTION INTRAMUSCULAR; INTRAVENOUS at 02:34

## 2018-03-28 RX ADMIN — IPRATROPIUM BROMIDE AND ALBUTEROL SULFATE SCH ML: .5; 3 SOLUTION RESPIRATORY (INHALATION) at 08:04

## 2018-03-28 RX ADMIN — IPRATROPIUM BROMIDE AND ALBUTEROL SULFATE SCH ML: .5; 3 SOLUTION RESPIRATORY (INHALATION) at 01:03

## 2018-03-28 NOTE — ED PDOC
History of Present Illness


History of Present Illness: 


Ngozi Malik is a 38 year old homeless  female well known to 

the ER, with a past medical history of hypertension and diabetes, who is 

presenting to the ED with complaints of chest pain onset 4 days ago. Patient 

was seen in the ED 2 days ago for similar complaints, flu-like symptoms, 

shortness of breath, and active wheezing. She was given a Duoneb treatment, 

which improved the wheezing but not the shortness of breath, and was advised 

for a chest CT. Patient at the time refused the CT and signed out AMA. Today, 

she reports that the symptoms have worsened and is agreeable to the chest CT. 

Patient denies any fevers, nausea, vomiting, or diarrhea. She offers no other 

medical complaints at this time.





PMD: Missael Tate











HPI: Influenza


Time Seen by Provider: 03/24/18 22:58


Chief Complaint: Cough, Cold, Congestion


Chief Complaint (Provider): Chest pain


History Per: Patient


Exam Limitations: no limitations


Onset/Duration Of Symptoms: Days (x4)


Symptoms include: chest pain.  denies: fever, vomiting, diarrhea





Past Medical History


Reviewed: Historical Data, Nursing Documentation, Vital Signs


Vital Signs: 


 Last Vital Signs











Temp  98.0 F   03/24/18 22:52


 


Pulse  102 H  03/24/18 22:52


 


Resp  16   03/24/18 22:52


 


BP  182/110 H  03/24/18 22:52


 


Pulse Ox  97   03/24/18 22:52














- Medical History


PMH: Asthma, Diabetes, Gastritis, HTN


   Denies: HIV, Hyperthyroidism, Migraine, Chronic Kidney Disease





- Surgical History


Surgical History: No Surg Hx





- Family History


Family History: States: Unknown Family Hx





- Social History


Current smoker - smoking cessation education provided: Yes (0.5 packs per day)


Alcohol: None


Drugs: Denies





- Home Medications


Home Medications: 


 Ambulatory Orders











 Medication  Instructions  Recorded


 


Acetaminophen [Tylenol 325mg tab] 650 mg PO Q6 PRN  tab 09/03/17


 


NIFEdipine ER [Procardia XL] 30 mg PO DAILY #30 ter 09/03/17


 


NIFEdipine ER [Procardia XL] 30 mg PO DAILY #15 tab 11/02/17


 


Azithromycin [Zithromax] 250 mg PO DAILY #6 tab 11/28/17


 


Naproxen 1 tab PO Q12 PRN #14 tab 12/18/17


 


cloNIDine [Catapres] 0.1 mg PO Q8 #90 tab 01/07/18


 


Clindamycin [Cleocin] 300 mg PO TID #30 cap 01/20/18


 


amLODIPine [Norvasc] 5 mg PO DAILY #30 tab 01/20/18


 


traMADol [Ultram] 50 mg PO Q8 #10 tab 01/20/18


 


metFORMIN [glucOPHAGE] 500 mg PO BID #30 tab 02/03/18


 


Meclizine [Meclizine*] 25 mg PO Q8 #15 tab 02/16/18


 


traMADol [Ultram] 50 mg PO Q8 #10 tab 02/23/18














- Allergies


Allergies/Adverse Reactions: 


 Allergies











Allergy/AdvReac Type Severity Reaction Status Date / Time


 


Seafood Allergy  SHORTNESS Uncoded 03/23/18 00:09





   OF BREATH  














Review of Systems


ROS Statement: Except As Marked, All Systems Reviewed And Found Negative


Constitutional: Negative for: Fever


Cardiovascular: Positive for: Chest Pain


Respiratory: Positive for: Shortness of Breath


Gastrointestinal: Negative for: Nausea, Vomiting, Diarrhea





Physical Exam





- Reviewed


Nursing Documentation Reviewed: Yes


Vital Signs Reviewed: Yes





- Physical Exam


Appears: Positive for: Non-toxic, No Acute Distress, Uncomfortable


Head Exam: Positive for: ATRAUMATIC, NORMAL INSPECTION, NORMOCEPHALIC


Skin: Positive for: Normal Color, Warm, Dry


Eye Exam: Positive for: EOMI, Normal appearance, PERRL


Neck: Positive for: Normal, Painless ROM, Supple


Cardiovascular/Chest: Positive for: Tachycardia.  Negative for: Murmur


Respiratory: Positive for: Normal Breath Sounds.  Negative for: Respiratory 

Distress


Gastrointestinal/Abdominal: Positive for: Normal Exam, Soft.  Negative for: 

Tenderness


Back: Positive for: Normal Inspection.  Negative for: L CVA Tenderness, R CVA 

Tenderness, Vertebral Tenderness


Extremity: Positive for: Normal ROM.  Negative for: Pedal Edema, Deformity, 

Swelling


Neurologic/Psych: Positive for: Alert, Oriented.  Negative for: Motor/Sensory 

Deficits





Medical Decision Making


Medical Decision Making: 


Time: 23:48


Impression: 38 year old female with chest pain and a history of hypertension, 

smoking, and obesity


Initial Plan:


--CT Chest


--EKG


--Benadryl 50 mg IV


--Solu-Medrol 125 mg IVP


--IV Fluids


--Accucheck





Patient sent straight for CT chest; labs recently completed.





CT Chest:





FINDINGS:


No pulmonary embolism.


No aortic dissection or aneurysm.


No pleural or pericardial effussions.


A small focal area of pleural thickening is noted in the lateral left lung.


There are small scattered hazy opacities bilaterally.


There are areas of consolidation in the lingula and right midlung.


Slight asymmetry of the thyroid lobes being more prominent on the left.


Splenic calcifications.





IMPRESSION:


Patchy hazy opacities bilaterally with areas of consolidation in the mid lungs 

likely representing a


combination of infectious infiltrates and atelectasis. Followup is recommended 

to ensure complete


resolution.





1:23





Patient exhibits persistent shortness of breath and will be admitted for 

observation and pneumonia. She is given IV Levaquin and case is referred to Dr. Tate. 


 


--------------------------------------------------------------------------------

-----------------


Scribe Attestation:


Documented by Chinyere Syed, acting as a scribe for Abimael Vazquez MD.





Provider Scribe Attestation:


All medical record entries made by the Scribe were at my direction and 

personally dictated by me. I have reviewed the chart and agree that the record 

accurately reflects my personal performance of the history, physical exam, 

medical decision making, and the department course for this patient. I have 

also personally directed, reviewed, and agree with the discharge instructions 

and disposition.





- ECG


O2 Sat by Pulse Oximetry: 97 (RA)


Pulse Ox Interpretation: Normal





Disposition





- Clinical Impression


Clinical Impression: 


 SOB (shortness of breath)








- Patient ED Disposition


Is Patient to be Admitted: Yes





- Disposition


Disposition: Transfer of Care


Disposition Time: 01:23


Condition: FAIR


Forms:  [employer] (English) Mu Juárez  MRN: [id number]  [id number]   EDC: 2018    Maternal Fetal Medicine- Fetal Cardiac Care Plan    FETAL DIAGNOSIS:     â¢ Possible small muscular ventricular septal defect    DELIVERY:     â¢ Location:   o [facility]  â¢ Provider to manage delivery:   o Dr. Nayeli Santos  â¢ Timing of delivery:   o To be determined by primary OB  â¢ Mode of delivery:   o No changes to delivery plan are indicated from a fetal cardiovascular standpoint.  -  There is no fetal cardiovascular indication for operative delivery. â¢ Resuscitation per NRP guidelines. NOTIFICATIONS:    â¢ OB to notify neonatologist when mother is admitted. [facility] Notify Pediatric Cardiology, Dr. Nelly Tenorio, [phone] if follow up is needed based on findings of  echocardiogram.     RECOMMENDATIONS:    â¢  echocardiogram 24-48 hours of life.   o Sooner if clinical concerns. Care plan created by Akila Caputo, RN, BSN ~ Maternal Fetal Medicine [facility] Coordinator on 2018.

## 2018-03-29 VITALS
TEMPERATURE: 98.2 F | DIASTOLIC BLOOD PRESSURE: 73 MMHG | RESPIRATION RATE: 17 BRPM | OXYGEN SATURATION: 95 % | SYSTOLIC BLOOD PRESSURE: 122 MMHG | HEART RATE: 91 BPM

## 2018-03-29 NOTE — DS
REASON FOR ADMISSION:  This is a 38-year-old  female who

was admitted for pneumonia and exacerbation of bronchial asthma.



COURSE OF HOSPITALIZATION:  The patient was admitted to medical floor and

she was started on IV antibiotics and IV steroids.  The patient showed

remarkable improvement and before completion of therapy, the patient

expressed that she has to go to make some appointments and she understood

all the risks of completing the treatment and she signed against medical

advice.



FINAL DIAGNOSES:  Pneumonia, hypertension, type 2 diabetes mellitus, and

exacerbation of bronchial asthma.





__________________________________________

[facility] MD Bebeto





DD:  03/29/2018 11:05:57

DT:  03/29/2018 11:22:29

Job # [id number]

## 2018-04-04 ENCOUNTER — HOSPITAL ENCOUNTER (INPATIENT)
Dept: HOSPITAL 14 - H.ER | Age: 38
LOS: 4 days | Discharge: HOME | DRG: 90 | End: 2018-04-08
Attending: INTERNAL MEDICINE | Admitting: INTERNAL MEDICINE
Payer: MEDICAID

## 2018-04-04 VITALS — BODY MASS INDEX: 39.4 KG/M2

## 2018-04-04 DIAGNOSIS — Z87.891: ICD-10-CM

## 2018-04-04 DIAGNOSIS — Z79.84: ICD-10-CM

## 2018-04-04 DIAGNOSIS — J18.9: Primary | ICD-10-CM

## 2018-04-04 DIAGNOSIS — Z91.013: ICD-10-CM

## 2018-04-04 DIAGNOSIS — K29.70: ICD-10-CM

## 2018-04-04 DIAGNOSIS — Z59.0: ICD-10-CM

## 2018-04-04 DIAGNOSIS — E11.9: ICD-10-CM

## 2018-04-04 DIAGNOSIS — J45.901: ICD-10-CM

## 2018-04-04 DIAGNOSIS — I11.9: ICD-10-CM

## 2018-04-04 SDOH — ECONOMIC STABILITY - HOUSING INSECURITY: HOMELESSNESS: Z59.0

## 2018-04-05 LAB
ALBUMIN SERPL-MCNC: 4.2 G/DL (ref 3.5–5)
ALBUMIN/GLOB SERPL: 1.2 {RATIO} (ref 1–2.1)
ALT SERPL-CCNC: 35 U/L (ref 9–52)
AST SERPL-CCNC: 16 U/L (ref 14–36)
BASE EXCESS BLDV CALC-SCNC: -0.2 MMOL/L (ref 0–2)
BASOPHILS # BLD AUTO: 0.1 K/UL (ref 0–0.2)
BASOPHILS NFR BLD: 0.6 % (ref 0–2)
BUN SERPL-MCNC: 10 MG/DL (ref 7–17)
CALCIUM SERPL-MCNC: 9 MG/DL (ref 8.4–10.2)
EOSINOPHIL # BLD AUTO: 0.2 K/UL (ref 0–0.7)
EOSINOPHIL NFR BLD: 1.6 % (ref 0–4)
ERYTHROCYTE [DISTWIDTH] IN BLOOD BY AUTOMATED COUNT: 15.7 % (ref 11.5–14.5)
GFR NON-AFRICAN AMERICAN: > 60
HGB BLD-MCNC: 12.3 G/DL (ref 12–16)
LYMPHOCYTES # BLD AUTO: 4 K/UL (ref 1–4.3)
LYMPHOCYTES NFR BLD AUTO: 31.2 % (ref 20–40)
MCH RBC QN AUTO: 24.4 PG (ref 27–31)
MCHC RBC AUTO-ENTMCNC: 32.1 G/DL (ref 33–37)
MCV RBC AUTO: 76 FL (ref 81–99)
MONOCYTES # BLD: 0.7 K/UL (ref 0–0.8)
MONOCYTES NFR BLD: 5.1 % (ref 0–10)
NEUTROPHILS # BLD: 7.9 K/UL (ref 1.8–7)
NEUTROPHILS NFR BLD AUTO: 61.5 % (ref 50–75)
NRBC BLD AUTO-RTO: 0.1 % (ref 0–0)
PCO2 BLDV: 44 MMHG (ref 40–60)
PH BLDV: 7.37 [PH] (ref 7.32–7.43)
PLATELET # BLD: 204 K/UL (ref 130–400)
PMV BLD AUTO: 10.7 FL (ref 7.2–11.7)
RBC # BLD AUTO: 5.03 MIL/UL (ref 3.8–5.2)
VENOUS BLOOD FIO2: 21 %
VENOUS BLOOD GAS PO2: 42 MM/HG (ref 30–55)
WBC # BLD AUTO: 12.8 K/UL (ref 4.8–10.8)

## 2018-04-05 RX ADMIN — ENOXAPARIN SODIUM SCH MG: 40 INJECTION SUBCUTANEOUS at 15:17

## 2018-04-05 RX ADMIN — ENOXAPARIN SODIUM SCH: 40 INJECTION SUBCUTANEOUS at 15:19

## 2018-04-05 NOTE — RAD
EXAM:

  XR Chest, 2 Views



EXAM DATE/TIME:

  4/5/2018 12:48 AM



CLINICAL HISTORY:

  38 years old, female; Signs and symptoms; Cough and shortness of breath; 

Symptoms not specified; Additional info: Cough, SOB



TECHNIQUE:

  Frontal and lateral views of the chest.



COMPARISON:

   Prior CT chest dated 3/24/2018.



FINDINGS:

  LUNGS:  Increased density in the right middle lobe, partially obscuring the 

right heart border, suspicious for an infiltrate/pneumonia. Compared to the 

 radiographs on the recent CT chest, this appears increased.  No evidence 

of diffuse pulmonary vascular congestion.

  PLEURAL SPACE:  No pneumothorax or pleural effusions seen.

  HEART:  Heart again appears mildly to moderately enlarged.

  MEDIASTINUM:  See below.

  BONES/JOINTS:  No acute bony abnormality visualized.

  VASCULATURE:  Thoracic aorta appears mildly ectatic.



IMPRESSION:     

- Findings suspicious for an infiltrate/pneumonia in the right middle lobe.

- Mild to moderate cardiomegaly.

- See above for remaining findings.

## 2018-04-05 NOTE — ED PDOC
HPI: CCC, URI, Sore Throat


Time Seen by Provider: 04/05/18 00:31


Chief Complaint (Nursing): Flu-like Symptoms


Chief Complaint (Provider): cough


History Per: Patient


History/Exam Limitations: no limitations


Onset/Duration Of Symptoms: Days (1 week)


Current Symptoms Are (Timing): Still Present


Associated Symptoms: Cough, Sputum


Additional Complaint(s): 





37 y/o female presents for evaluation of productive cough x 1 week.  Associated 

tactile fever, shortness of breath.  Patient states she was admitted on 3/25 

for pneumonia, and signed out against medical advice on 3/28 and states she didn

't take the prescribed antibiotics and steroids because they were never sent to 

her pharmacy.  Denies headache, nasal congestion/discharge, throat pain, chest 

pain, palpitations, abdominal pain, leg pain/swelling, recent travel.





Patient also complaining of pain to left foot x 2 months after a fall.  Patient 

was diagnosed with a foot fracture, and has not followed up with podiatry yet.


Denies new injury, numbness/weakness left lower extremity.  








Past Medical History


Reviewed: Historical Data, Nursing Documentation, Vital Signs


Vital Signs: 


 Last Vital Signs











Temp  98.4 F   04/05/18 05:16


 


Pulse  104 H  04/05/18 05:16


 


Resp  18   04/05/18 05:16


 


BP  128/74   04/05/18 05:16


 


Pulse Ox  98   04/05/18 05:28














- Medical History


PMH: Asthma, Diabetes, Gastritis, HTN


   Denies: HIV, Hyperthyroidism, Migraine, Chronic Kidney Disease





- Surgical History


Surgical History: No Surg Hx





- Family History


Family History: States: Unknown Family Hx





- Home Medications


Home Medications: 


 Ambulatory Orders











 Medication  Instructions  Recorded


 


MetFORMIN [glucoPHAGE] 1,000 mg PO BID 04/05/18


 


cloNIDine [Catapres] 0.1 mg PO BID 04/05/18














- Allergies


Allergies/Adverse Reactions: 


 Allergies











Allergy/AdvReac Type Severity Reaction Status Date / Time


 


Seafood Allergy  SHORTNESS Uncoded 04/04/18 23:29





   OF BREATH  














Review of Systems


ROS Statement: Except As Marked, All Systems Reviewed And Found Negative


Respiratory: Positive for: Cough, Shortness of Breath, Sputum


Musculoskeletal: Positive for: Foot Pain





Physical Exam





- Reviewed


Nursing Documentation Reviewed: Yes


Vital Signs Reviewed: Yes





- Physical Exam


Appears: Positive for: Well, Non-toxic, No Acute Distress (sleeping)


Head Exam: Positive for: ATRAUMATIC, NORMAL INSPECTION, NORMOCEPHALIC


Skin: Positive for: Normal Color


Eye Exam: Positive for: Normal appearance


ENT: Positive for: Normal ENT Inspection


Cardiovascular/Chest: Positive for: Regular Rate, Rhythm


Respiratory: Positive for: Wheezing


Gastrointestinal/Abdominal: Positive for: Normal Exam


Back: Positive for: Normal Inspection


Extremity: Positive for: Normal ROM, Tenderness (left calcaneal area without 

swelling, erythema, deformity)


Neurologic/Psych: Positive for: Alert, Oriented.  Negative for: Motor/Sensory 

Deficits





- Laboratory Results


Result Diagrams: 


 04/05/18 03:56





 04/05/18 03:56





- ECG


ECG: Positive for: Viewed By Me (reviewed by ED attending)


ECG Rhythm: Positive for: Sinus Rhythm


O2 Sat by Pulse Oximetry: 98





- Progress


ED Course And Treament: 


labs, chest xray, 





EXAM:


XR Chest, 2 Views


EXAM DATE/TIME:


4/5/2018 12:48 AM


CLINICAL HISTORY:


38 years old, female; Signs and symptoms; Cough and shortness of breath; 

Symptoms not specified;


Additional info: Cough, SOB


TECHNIQUE:


Frontal and lateral views of the chest.


COMPARISON:


Prior CT chest dated 3/24/2018.


FINDINGS:


LUNGS: Increased density in the right middle lobe, partially obscuring the 

right heart border,


suspicious for an infiltrate/pneumonia. Compared to the  radiographs on 

the recent CT chest,


this appears increased. No evidence of diffuse pulmonary vascular congestion.


PLEURAL SPACE: No pneumothorax or pleural effusions seen.


HEART: Heart again appears mildly to moderately enlarged.


MEDIASTINUM: See below.


BONES/JOINTS: No acute bony abnormality visualized.


VASCULATURE: Thoracic aorta appears mildly ectatic.


IMPRESSION:


- Findings suspicious for an infiltrate/pneumonia in the right middle lobe.


- Mild to moderate cardiomegaly.


- See above for remaining findings.





IV Levaquin dose ordered.


On re-eval, patient still reports feeling short of breath.  Scant wheezing 

still noted.


Case discussed with Dr. Tate, will admit for pneumonia








Disposition





- Clinical Impression


Clinical Impression: 


 Pneumonia








- Patient ED Disposition


Is Patient to be Admitted: Yes





- Disposition


Disposition Time: 06:16


Condition: FAIR

## 2018-04-06 LAB
BUN SERPL-MCNC: 14 MG/DL (ref 7–17)
CALCIUM SERPL-MCNC: 9.5 MG/DL (ref 8.4–10.2)
ERYTHROCYTE [DISTWIDTH] IN BLOOD BY AUTOMATED COUNT: 15.9 % (ref 11.5–14.5)
GFR NON-AFRICAN AMERICAN: > 60
HGB BLD-MCNC: 11.9 G/DL (ref 12–16)
MCH RBC QN AUTO: 24.3 PG (ref 27–31)
MCHC RBC AUTO-ENTMCNC: 31.9 G/DL (ref 33–37)
MCV RBC AUTO: 76.1 FL (ref 81–99)
PLATELET # BLD: 202 K/UL (ref 130–400)
RBC # BLD AUTO: 4.89 MIL/UL (ref 3.8–5.2)
WBC # BLD AUTO: 18.2 K/UL (ref 4.8–10.8)

## 2018-04-06 RX ADMIN — NIFEDIPINE SCH MG: 30 TABLET, FILM COATED, EXTENDED RELEASE ORAL at 16:30

## 2018-04-06 RX ADMIN — ENOXAPARIN SODIUM SCH: 40 INJECTION SUBCUTANEOUS at 08:53

## 2018-04-06 NOTE — RAD
HISTORY:



COMPARISON:

04/05/2018.



TECHNIQUE:

Chest PA and lateral



FINDINGS:



LINES AND TUBES:

None. 



LUNG AND PLEURA:

The lungs are well inflated. There is patchy airspace disease in the 

right mid lung and lower lobe and bibasilar atelectasis. There is 

mild pulmonary venous congestion. 



HEART AND MEDIASTINUM:

There is persistent mild cardiomegaly. The hilar and mediastinal 

contours are within normal limits.



SKELETAL STRUCTURES:

The bony structures are within normal limits for the patient's age.



VISUALIZED UPPER ABDOMEN:

Normal.



OTHER FINDINGS:

None.



IMPRESSION:

Patchy airspace disease in the right middle lobe and lung base. 

Findings could represent atelectasis or superimposed pneumonia. 

Follow-up after medical management is recommended to ensure complete 

resolution.

## 2018-04-06 NOTE — PN
DAILY PROGRESS NOTE



DATE:  04/06/2018



SUBJECTIVE:  The patient is seen today 04/06/2018.  She is not in any

cardiopulmonary distress.  The patient still has some cough and shortness

of breath.



OBJECTIVE:

VITAL SIGNS:  Blood pressure is 139/81, temperature 98.8, respiratory rate

20, and pulse 78.

HEENT:  Pupils equal and reactive to light.  Normal-appearing mucosa of the

conjunctivae, oropharynx, and nasal membrane mucosa.

NECK:  Supple.  No JVD.  No carotid bruit.  No lymph node.  No thyromegaly.

CHEST AND LUNGS:  Bilateral symmetrical expansion.  Good air exchange.  No

rales.  Few scattered rhonchi.

CARDIOVASCULAR:  PMI not localized.  S1 and S2.  No additional sounds.

ABDOMEN:  Normoactive bowel sounds.  No tenderness.  No organomegaly.  No

masses.

EXTREMITIES:  No cyanosis.  No clubbing.  No edema.

CENTRAL NERVOUS SYSTEM:  Alert, awake, and oriented x3.  No neurological

deficit could be appreciated.



ASSESSMENT:  Pneumonia, hypertension, and type 2 diabetes mellitus.



PLAN:  Continue current IV antibiotics as well as resume the patient's home

medications of hypertension and diabetes.





__________________________________________

Missael Tate MD





DD:  04/06/2018 22:16:27

DT:  04/06/2018 22:19:16

Job # [id number]

## 2018-04-06 NOTE — HP
HISTORY OF PRESENT ILLNESS:  This is a 38-year-old  female

with history of multiple medical problems presented to the emergency room

with shortness of breath and cough and wheezing.  The patient was evaluated

and she was found to have right middle lobe infiltrate.  Subsequently, the

patient was admitted for further management.  The patient has a history of

smoking and she currently lives in a shelter.  The patient was recently in

the hospital for similar presentation and she signed against medical

advice.  The patient was started on IV antibiotics and admitted for further

management.



PAST MEDICAL HISTORY:  Type 2 diabetes mellitus, hypertension, and

bronchial asthma.



SOCIAL HISTORY:  Positive for smoking.  Denied EtOH or substance abuse.



FAMILY HISTORY:  Noncontributory.



PHYSICAL EXAMINATION:

GENERAL:  The patient is in bed, comfortable at the time of this

examination.

VITAL SIGNS:  Blood pressure 154/78, temperature 98.9, respiratory rate 19,

and pulse 95.

HEENT:  Pupils equal and reactive to light.  Normal-appearing mucosa of the

conjunctivae, oropharynx, and nasal membrane mucosa.

NECK:  Supple.  No JVD.  No carotid bruit.  No lymph node.  No thyromegaly.

CHEST AND LUNGS:  Bilateral symmetrical expansion.  Good air exchange. 

Scattered rhonchi and no rales.

CARDIOVASCULAR:  PMI not localized.  S1 and S2.  No additional sounds.

ABDOMEN:  Normoactive bowel sounds.  No tenderness.  No organomegaly.  No

masses.

EXTREMITIES:  No cyanosis.  No clubbing.  No edema.

CENTRAL NERVOUS SYSTEM:  Alert, awake, and oriented x2 and no neurological

deficit could be appreciated.



ASSESSMENT:  Pneumonia, hypertension, type 2 diabetes mellitus, and mild

exacerbation of bronchial asthma.



PLAN:  Continue current IV antibiotics and resume the patient's

antihypertensive medications and continue Accu-Cheks with insulin coverage

and resume the patient's diabetes medication.





__________________________________________

Missael Tate MD





DD:  04/05/2018 21:17:18

DT:  04/05/2018 21:20:50

Job # [id number]

## 2018-04-06 NOTE — CARD
--------------- APPROVED REPORT --------------





EKG Measurement

Heart [id number]

MN [id number]

[id number]

[id number]

[id number]



<Conclusion>

Normal sinus rhythm

Possible Left atrial enlargement

Borderline ECG

## 2018-04-07 LAB
BUN SERPL-MCNC: 15 MG/DL (ref 7–17)
CALCIUM SERPL-MCNC: 9.5 MG/DL (ref 8.4–10.2)
ERYTHROCYTE [DISTWIDTH] IN BLOOD BY AUTOMATED COUNT: 15.8 % (ref 11.5–14.5)
GFR NON-AFRICAN AMERICAN: > 60
HGB BLD-MCNC: 12.6 G/DL (ref 12–16)
MCH RBC QN AUTO: 24.4 PG (ref 27–31)
MCHC RBC AUTO-ENTMCNC: 31.7 G/DL (ref 33–37)
MCV RBC AUTO: 76.9 FL (ref 81–99)
PLATELET # BLD: 192 K/UL (ref 130–400)
RBC # BLD AUTO: 5.18 MIL/UL (ref 3.8–5.2)
WBC # BLD AUTO: 14.1 K/UL (ref 4.8–10.8)

## 2018-04-07 RX ADMIN — ENOXAPARIN SODIUM SCH MG: 40 INJECTION SUBCUTANEOUS at 09:24

## 2018-04-07 RX ADMIN — NIFEDIPINE SCH MG: 30 TABLET, FILM COATED, EXTENDED RELEASE ORAL at 09:25

## 2018-04-07 RX ADMIN — ENOXAPARIN SODIUM SCH: 40 INJECTION SUBCUTANEOUS at 09:27

## 2018-04-08 VITALS — TEMPERATURE: 97.8 F | RESPIRATION RATE: 20 BRPM

## 2018-04-08 VITALS — OXYGEN SATURATION: 97 %

## 2018-04-08 VITALS — HEART RATE: 85 BPM | DIASTOLIC BLOOD PRESSURE: 91 MMHG | SYSTOLIC BLOOD PRESSURE: 142 MMHG

## 2018-04-08 RX ADMIN — ENOXAPARIN SODIUM SCH: 40 INJECTION SUBCUTANEOUS at 09:02

## 2018-04-08 RX ADMIN — NIFEDIPINE SCH MG: 30 TABLET, FILM COATED, EXTENDED RELEASE ORAL at 09:02

## 2018-04-08 NOTE — CP.PCM.PN
Subjective





- Date & Time of Evaluation


Date of Evaluation: 04/08/18


Time of Evaluation: 08:05





Objective





- Vital Signs/Intake and Output


Vital Signs (last 24 hours): 


 











Temp Pulse Resp BP Pulse Ox


 


 98.2 F   78   18   134/76   97 


 


 04/08/18 00:21  04/08/18 00:21  04/08/18 00:21  04/08/18 00:21  04/08/18 00:21











- Medications


Medications: 


 Current Medications





Albuterol/Ipratropium (Duoneb 3 Mg/0.5 Mg (3 Ml) Ud)  3 ml INH RQ4 PRN


   PRN Reason: Shortness of Breath


Cholecalciferol (Vitamin D)  1,000 intlu PO QWK [facility]


Clonidine HCl (Catapres)  0.1 mg PO BID [facility]


   Last Admin: 04/07/18 17:54 Dose:  0.1 mg


Dextrose (Dextrose 50% Inj)  0 ml IV STAT PRN; Protocol


   PRN Reason: Hypoglycemia Protocol


Dextrose (Glutose 15)  0 gm PO ONCE PRN; Protocol


   PRN Reason: Hypoglycemia Protocol


Enoxaparin Sodium (Lovenox)  40 mg SC DAILY [facility]


   PRN Reason: Protocol


   Last Admin: 04/07/18 09:27 Dose:  Not Given


Glucagon (Glucagen Diagnostic Kit)  0 mg IM STAT PRN; Protocol


   PRN Reason: Hypoglycemia Protocol


Levofloxacin/Dextrose (Levaquin 750mg)  750 mg in 150 mls @ 100 mls/hr IVPB 

DAILY [facility]


   Last Admin: 04/07/18 09:24 Dose:  100 mls/hr


Insulin Human Regular (Humulin R)  0 units SC ACHS DAVIS


   PRN Reason: Protocol


   Last Admin: 04/08/18 06:56 Dose:  Not Given


Metformin HCl (Glucophage)  1,000 mg PO BID [facility]


   Last Admin: 04/07/18 17:54 Dose:  1,000 mg


Nifedipine (Procardia Xl)  30 mg PO DAILY [facility]


   Last Admin: 04/07/18 09:25 Dose:  30 mg











- Labs


Labs: 


 





 04/07/18 05:05 





 04/07/18 05:05

## 2018-04-08 NOTE — CP.PCM.DIS
Provider





- Provider


Date of Admission: 


04/05/18 06:16





Attending physician: 


Missael Tate MD





Time Spent in preparation of Discharge (in minutes): 30





Hospital Course





- Lab Results


Lab Results: 


 Micro Results





04/05/18 04:30   Blood   Blood Culture - Preliminary


                            NO GROWTH AFTER 3 DAYS


04/05/18 03:40   Blood   Blood Culture - Preliminary


                            NO GROWTH AFTER 3 DAYS





 Most Recent Lab Values











WBC  14.1 K/uL (4.8-10.8)  H  04/07/18  05:05    


 


RBC  5.18 Mil/uL (3.80-5.20)   04/07/18  05:05    


 


Hgb  12.6 g/dL (12.0-16.0)   04/07/18  05:05    


 


Hct  39.8 % (34.0-47.0)   04/07/18  05:05    


 


MCV  76.9 fl (81.0-99.0)  L  04/07/18  05:05    


 


MCH  24.4 pg (27.0-31.0)  L  04/07/18  05:05    


 


MCHC  31.7 g/dL (33.0-37.0)  L  04/07/18  05:05    


 


RDW  15.8 % (11.5-14.5)  H  04/07/18  05:05    


 


Plt Count  192 K/uL (130-400)   04/07/18  05:05    


 


MPV  10.7 fl (7.2-11.7)   04/05/18  03:56    


 


Neut % (Auto)  61.5 % (50.0-75.0)   04/05/18  03:56    


 


Lymph % (Auto)  31.2 % (20.0-40.0)   04/05/18  03:56    


 


Mono % (Auto)  5.1 % (0.0-10.0)   04/05/18  03:56    


 


Eos % (Auto)  1.6 % (0.0-4.0)   04/05/18  03:56    


 


Baso % (Auto)  0.6 % (0.0-2.0)   04/05/18  03:56    


 


Neut # (Auto)  7.9 K/uL (1.8-7.0)  H  04/05/18  03:56    


 


Lymph # (Auto)  4.0 K/uL (1.0-4.3)   04/05/18  03:56    


 


Mono # (Auto)  0.7 K/uL (0.0-0.8)   04/05/18  03:56    


 


Eos # (Auto)  0.2 K/uL (0.0-0.7)   04/05/18  03:56    


 


Baso # (Auto)  0.1 K/uL (0.0-0.2)   04/05/18  03:56    


 


pO2  42 mm/Hg (30-55)   04/05/18  04:25    


 


VBG pH  7.37  (7.32-7.43)   04/05/18  04:25    


 


VBG pCO2  44 mmHg (40-60)   04/05/18  04:25    


 


VBG HCO3  24.2 mmol/L  04/05/18  04:25    


 


VBG Total CO2  26.8 mmol/L (22-28)   04/05/18  04:25    


 


VBG O2 Sat (Calc)  79.1 % (40-65)  H  04/05/18  04:25    


 


VBG Base Excess  -0.2 mmol/L (0.0-2.0)  L  04/05/18  04:25    


 


VBG Potassium  3.9 mmol/L (3.6-5.2)   04/05/18  04:25    


 


Sodium  138.0 mmol/L (132-148)   04/05/18  04:25    


 


Chloride  108.0 mmol/L ()  H  04/05/18  04:25    


 


Glucose  180 mg/dL ()  H  04/05/18  04:25    


 


Lactate  1.7 mmol/L (0.7-2.1)   04/05/18  04:25    


 


FiO2  21.0 %  04/05/18  04:25    


 


Sodium  142 mmol/l (132-148)   04/07/18  05:05    


 


Potassium  3.9 MMOL/L (3.6-5.0)   04/07/18  05:05    


 


Chloride  104 mmol/L ()   04/07/18  05:05    


 


Carbon Dioxide  21 mmol/L (22-30)  L  04/07/18  05:05    


 


Anion Gap  21  (10-20)  H  04/07/18  05:05    


 


BUN  15 mg/dl (7-17)   04/07/18  05:05    


 


Creatinine  0.8 mg/dl (0.7-1.2)   04/07/18  05:05    


 


Est GFR ( Amer)  > 60   04/07/18  05:05    


 


Est GFR (Non-Af Amer)  > 60   04/07/18  05:05    


 


POC Glucose (mg/dL)  139 mg/dL ()  H  04/08/18  05:43    


 


Random Glucose  156 mg/dL ()  H  04/07/18  05:05    


 


Calcium  9.5 mg/dL (8.4-10.2)   04/07/18  05:05    


 


Total Bilirubin  0.6 mg/dl (0.2-1.3)   04/05/18  03:56    


 


AST  16 U/L (14-36)   04/05/18  03:56    


 


ALT  35 U/L (9-52)   04/05/18  03:56    


 


Alkaline Phosphatase  67 U/L ()   04/05/18  03:56    


 


Total Protein  7.5 G/DL (6.3-8.2)   04/05/18  03:56    


 


Albumin  4.2 g/dL (3.5-5.0)   04/05/18  03:56    


 


Globulin  3.4 gm/dL (2.2-3.9)   04/05/18  03:56    


 


Albumin/Globulin Ratio  1.2  (1.0-2.1)   04/05/18  03:56    


 


Venous Blood Potassium  3.9 mmol/L (3.6-5.2)   04/05/18  04:25    


 


Ur L.pneumophila Ag  Negative  (NEGATIVE)   04/06/18  18:00    














- Hospital Course


Hospital Course: 





37 y/o female presents for evaluation of productive cough x 1 week.  Associated 

tactile fever, shortness of breath.  Patient states she was admitted on 3/25 

for pneumonia, and signed out against medical advice on 3/28 and states she didn

't take the prescribed antibiotics and steroids because they were never sent to 

her pharmacy. 





Pt improved with IV Levaquin, WBC trending down. Pt wishes to go home, afebrile

, no dyspnea. Stable for discharge with 3 more days of Levaquin and follow up 

with PMD in one week.





Discharge Exam





- Head Exam


Head Exam: ATRAUMATIC, NORMAL INSPECTION, NORMOCEPHALIC





- Eye Exam


Eye Exam: EOMI, Normal appearance, PERRL


Pupil Exam: NORMAL ACCOMODATION





- ENT Exam


ENT Exam: Mucous Membranes Moist, Normal Oropharynx





- Respiratory Exam


Respiratory Exam: Wheezes, NORMAL BREATHING PATTERN.  absent: Accessory Muscle 

Use, Chest Wall Tenderness, Prolonged Expiratory Phase





- Cardiovascular Exam


Cardiovascular Exam: RRR, +S1, +S2





- GI/Abdominal Exam


GI & Abdominal Exam: Normal Bowel Sounds, Soft.  absent: Unremarkable





- Extremities Exam


Extremities exam: normal capillary refill, pedal pulses present





- Back Exam


Back exam: absent: CVA tenderness (L), CVA tenderness (R)





- Neurological Exam


Neurological exam: Alert, Oriented x3





- Psychiatric Exam


Psychiatric exam: Normal Affect, Normal Mood





- Skin


Skin Exam: Dry, Warm





Discharge Plan





- Discharge Medications


Prescriptions: 


cloNIDine [Catapres] 0.1 mg PO Q12H #60 tab


Levofloxacin [Levaquin] 500 mg PO DAILY #3 tablet


MetFORMIN [glucoPHAGE] 500 mg PO BID #60 tab


NIFEdipine ER [Procardia XL] 30 mg PO DAILY #30 ter





- Follow Up Plan


Condition: FAIR


Disposition: HOME/ ROUTINE


Additional Instructions: 


FOLLOW UP DR. TATE ONE WEEK.

## 2018-04-10 NOTE — PN
DAILY PROGRESS NOTE



DATE:  04/07/2018



SUBJECTIVE:  The patient is seen today 04/07/2018.  She is not in any

cardiopulmonary distress.  The patient still has some cough and wheezing.



OBJECTIVE:

VITAL SIGNS:  Blood pressure 124/80, temperature 97.9, respiratory rate 20,

and pulse 73.

HEENT:  Pupils equal and reactive to light.  Normal-appearing mucosa of the

conjunctivae, oropharynx, and nasal membrane mucosa.

NECK:  Supple.  No JVD.  No carotid bruit.  No lymph node.  No thyromegaly.

CHEST AND LUNGS:  Bilateral symmetrical expansion, good air exchange.  No

rales.  No rhonchi.

CARDIOVASCULAR:  PMI not localized, S1 and S2.  No additional sounds.

ABDOMEN:  Normoactive bowel sounds.  No tenderness.  No organomegaly.  No

masses.

EXTREMITIES:  No cyanosis.  No clubbing.  No edema.

CENTRAL NERVOUS SYSTEM:  Alert, awake, and oriented x2.  No neurological

deficit could be appreciated.



ASSESSMENT:  Hypertension, type 2 diabetes mellitus, pneumonia, and history

of bronchial asthma.



PLAN:  Continue current IV antibiotics and bronchodilators.  Continue

current antihypertensive medications and plan to discharge the patient once

symptoms improve as the patient left the hospital and came back for the

same reason.





__________________________________________

Missael Tate MD





DD:  04/07/2018 19:50:45

DT:  04/07/2018 19:52:57

Job # [id number]

## 2018-04-14 ENCOUNTER — HOSPITAL ENCOUNTER (EMERGENCY)
Dept: HOSPITAL 14 - H.ER | Age: 38
LOS: 1 days | Discharge: HOME | End: 2018-04-15
Payer: MEDICAID

## 2018-04-14 VITALS — TEMPERATURE: 98 F

## 2018-04-14 VITALS — BODY MASS INDEX: 39.4 KG/M2

## 2018-04-14 DIAGNOSIS — R42: Primary | ICD-10-CM

## 2018-04-14 DIAGNOSIS — Z79.84: ICD-10-CM

## 2018-04-14 DIAGNOSIS — I51.7: ICD-10-CM

## 2018-04-14 DIAGNOSIS — I11.9: ICD-10-CM

## 2018-04-14 DIAGNOSIS — J45.909: ICD-10-CM

## 2018-04-14 DIAGNOSIS — E11.9: ICD-10-CM

## 2018-04-14 PROCEDURE — 70450 CT HEAD/BRAIN W/O DYE: CPT

## 2018-04-14 PROCEDURE — 84484 ASSAY OF TROPONIN QUANT: CPT

## 2018-04-14 PROCEDURE — 82948 REAGENT STRIP/BLOOD GLUCOSE: CPT

## 2018-04-14 PROCEDURE — 85378 FIBRIN DEGRADE SEMIQUANT: CPT

## 2018-04-14 PROCEDURE — 93005 ELECTROCARDIOGRAM TRACING: CPT

## 2018-04-14 PROCEDURE — 94640 AIRWAY INHALATION TREATMENT: CPT

## 2018-04-14 PROCEDURE — 99285 EMERGENCY DEPT VISIT HI MDM: CPT

## 2018-04-14 PROCEDURE — 80053 COMPREHEN METABOLIC PANEL: CPT

## 2018-04-14 PROCEDURE — 96360 HYDRATION IV INFUSION INIT: CPT

## 2018-04-14 PROCEDURE — 85025 COMPLETE CBC W/AUTO DIFF WBC: CPT

## 2018-04-14 PROCEDURE — 81025 URINE PREGNANCY TEST: CPT

## 2018-04-14 PROCEDURE — 83880 ASSAY OF NATRIURETIC PEPTIDE: CPT

## 2018-04-14 PROCEDURE — 71045 X-RAY EXAM CHEST 1 VIEW: CPT

## 2018-04-15 VITALS — RESPIRATION RATE: 18 BRPM

## 2018-04-15 VITALS — HEART RATE: 82 BPM | SYSTOLIC BLOOD PRESSURE: 154 MMHG | DIASTOLIC BLOOD PRESSURE: 73 MMHG | OXYGEN SATURATION: 99 %

## 2018-04-15 LAB
ALBUMIN SERPL-MCNC: 3.8 G/DL (ref 3.5–5)
ALBUMIN/GLOB SERPL: 1.2 {RATIO} (ref 1–2.1)
ALT SERPL-CCNC: 27 U/L (ref 9–52)
AST SERPL-CCNC: 23 U/L (ref 14–36)
BASOPHILS # BLD AUTO: 0.2 K/UL (ref 0–0.2)
BASOPHILS NFR BLD: 1.9 % (ref 0–2)
BNP SERPL-MCNC: 63.7 PG/ML (ref 0–450)
BUN SERPL-MCNC: 12 MG/DL (ref 7–17)
CALCIUM SERPL-MCNC: 9.4 MG/DL (ref 8.4–10.2)
EOSINOPHIL # BLD AUTO: 0.2 K/UL (ref 0–0.7)
EOSINOPHIL NFR BLD: 2.3 % (ref 0–4)
ERYTHROCYTE [DISTWIDTH] IN BLOOD BY AUTOMATED COUNT: 16.5 % (ref 11.5–14.5)
GFR NON-AFRICAN AMERICAN: > 60
HGB BLD-MCNC: 11.9 G/DL (ref 12–16)
LYMPHOCYTES # BLD AUTO: 3.4 K/UL (ref 1–4.3)
LYMPHOCYTES NFR BLD AUTO: 34.3 % (ref 20–40)
MCH RBC QN AUTO: 24.8 PG (ref 27–31)
MCHC RBC AUTO-ENTMCNC: 32.2 G/DL (ref 33–37)
MCV RBC AUTO: 77.1 FL (ref 81–99)
MONOCYTES # BLD: 0.7 K/UL (ref 0–0.8)
MONOCYTES NFR BLD: 6.8 % (ref 0–10)
NEUTROPHILS # BLD: 5.4 K/UL (ref 1.8–7)
NEUTROPHILS NFR BLD AUTO: 54.7 % (ref 50–75)
NRBC BLD AUTO-RTO: 0.1 % (ref 0–0)
PLATELET # BLD: 177 K/UL (ref 130–400)
PMV BLD AUTO: 9.7 FL (ref 7.2–11.7)
RBC # BLD AUTO: 4.8 MIL/UL (ref 3.8–5.2)
WBC # BLD AUTO: 9.9 K/UL (ref 4.8–10.8)

## 2018-04-15 NOTE — CARD
--------------- APPROVED REPORT --------------





EKG Measurement

Heart [id number]

[place] 132P62

[id number]

[id number]

[id number]



<Conclusion>

Normal sinus rhythm

Biatrial enlargement

Left ventricular hypertrophy

Nonspecific T wave abnormality

Abnormal ECG

## 2018-04-15 NOTE — RAD
HISTORY:

dizziness  



COMPARISON:

04/06/2018 



FINDINGS:



LUNGS:

Bilateral interstitial changes, new since prior exam.



PLEURA:

No significant pleural effusion identified, no pneumothorax apparent.



CARDIOVASCULAR:

Normal.



OSSEOUS STRUCTURES:

No significant abnormalities.



VISUALIZED UPPER ABDOMEN:

Normal.



OTHER FINDINGS:

None.



IMPRESSION:

Bilateral interstitial changes, new since prior exam.

## 2018-04-15 NOTE — ED PDOC
Syncope/Near Syncope/Dizziness


Time Seen by Provider: 04/15/18 00:36


Chief Complaint (Nursing): Dizziness/Lightheaded


Chief Complaint (Provider): Dizziness


History Per: Patient


History/Exam Limitations: no limitations


Onset/Duration Of Symptoms: Hrs


Current Symptoms Are (Timing): Still Present


Activity At Onset Of Symptoms: Change In Head Position


Additional Complaint(s): 


37yo female with history of asthma, presents to ED with complaints of dizziness 

which started 2 hours ago. Patient states she went to [employer] and felt dizzy

, describing it as a room spinning sesnation. patient states her symptom are 

worse with changes in head position and reports she has never had such symptoms 

before. She also reports midsternal chest pain for the past 2 days with 

shortness of breath (states she is always shortness of breath due to asthma). 

Otherwise: (-) lightheadedness, (-) trauma, (-) headache, (-) tinnitus, (-) 

hearing loss, (-) chest pain, (-) dyspnea, (-) fever, (-) vomiting, (-) diarrhea

, (-) syncope, (-) GI bleeding. Of note, patient was recently admitted due to 

pneumonia. 





Past Medical History


Reviewed: Historical Data, Nursing Documentation, Vital Signs


Vital Signs: 





 Last Vital Signs











Temp  98.0 F   04/14/18 22:51


 


Pulse  88   04/14/18 22:51


 


Resp  16   04/14/18 22:51


 


BP  153/94 H  04/14/18 22:51


 


Pulse Ox  100   04/14/18 22:51














- Medical History


PMH: Asthma, Diabetes, Gastritis, HTN, Pneumonia


   Denies: HIV, Hyperthyroidism, Migraine, Chronic Kidney Disease





- Family History


Family History: States: Unknown Family Hx





- Home Medications


Home Medications: 


 Ambulatory Orders











 Medication  Instructions  Recorded


 


Cholecalciferol [Vitamin D 1000 IU] 1 tab PO QWK 04/05/18


 


Levofloxacin [Levaquin] 500 mg PO DAILY #3 tablet 04/08/18


 


MetFORMIN [glucoPHAGE] 500 mg PO BID #60 tab 04/08/18


 


NIFEdipine ER [Procardia XL] 30 mg PO DAILY #30 ter 04/08/18


 


cloNIDine [Catapres] 0.1 mg PO Q12H #60 tab 04/08/18














- Allergies


Allergies/Adverse Reactions: 


 Allergies











Allergy/AdvReac Type Severity Reaction Status Date / Time


 


Seafood Allergy  SHORTNESS Uncoded 04/14/18 22:51





   OF BREATH  














Review of Systems


ROS Statement: Except As Marked, All Systems Reviewed And Found Negative


Constitutional: Negative for: Fever, Chills


Cardiovascular: Positive for: Chest Pain


Respiratory: Positive for: Shortness of Breath


Gastrointestinal: Negative for: Nausea, Vomiting, Abdominal Pain


Neurological: Positive for: Dizziness.  Negative for: Weakness





Physical Exam





- Reviewed


Nursing Documentation Reviewed: Yes


Vital Signs Reviewed: Yes





- Physical Exam


Comments: 


GENERALIZED APPEARANCE:Patient is sleeping but arousable, oriented x3 in no 

acute distress.


SKIN: Warm, dry; (-) cyanosis.


HEAD: (-) scalp swelling or tenderness.


EYES: (-) conjunctival pallor.


ENMT: Mucous membranes dry.


NECK: (-) tenderness, (-) stiffness, (-) lymphadenopathy.


CHEST AND RESPIRATORY: (-) rales, (-) rhonchi, (+) faint expiratory wheeze to 

bilateral lung bases


HEART AND CARDIOVASCULAR: (-) irregularity; (-) murmur, (-) gallop.


ABDOMEN AND GI: Soft; (-) distention, (-) tenderness, (-) rebound, (-) guarding

, (-) palpable masses, (-) flank tenderness.


EXTREMITIES: (-) deformity; (-) edema. Distal pulses:  present.


NEURO AND PSYCH: Mental status as above. CNs: (-) nystagmus; Pupils EOMI, (-) 

facial asymmetry; (-) dysarthria; tongue and uvula midline. Strength symmetric. 

Gait: unable to test as patient states she felt too dizzy to stand and walk.








- Laboratory Results


Result Diagrams: 


 04/15/18 04:51





 04/15/18 04:51





- ECG


O2 Sat by Pulse Oximetry: 100 (RA)


Pulse Ox Interpretation: Normal





Medical Decision Making


Medical Decision Making: 


Impression: Dizziness, rule out PE, MI or ACS


Plan:


-- Labs


-- Chest x-ray


-- CT Head w/o contrast


-- IV Fluids


-- Duoneb 3ml INH








On re-evaluation, patient sleepig comfortably in bed in no acute distress, 

breathing easy and unlabored. 


CXR: minimal change from most recent study. 


CT head w/o contrast : FINDINGS:


Brain: No intracranial hemorrhage. No mass. No definite edema.


Ventricles: No hydrocephalus.


Bones/joints: No acute fracture.


Soft tissues: Unremarkable.


Lymph nodes: Probable intraparotid lymph node.


Sinuses: Scattered minimal to mild mucosal thickening. Small fluid within right 

maxillary sinus.


Minimal fluid within sphenoid sinuses.


Mastoid air cells: No mastoid effusion.


Orbits: Unremarkable as visualized.


IMPRESSION:


1. No definite acute intracranial abnormality.


2. Sinus disease.


3. Incidental/non-acute findings are described above.





Dictated and Authenticated by: Jd More MD


04/15/2018 4:18 AM Eastern Time ([place] & [place])





Labs reviewed : trop (-), BNP (-), Ddimer (-). 


On re-evaluation, patient reports improvement of symptoms, denies any dizziness

, SOB or CP at this time. On exam, patient remains AAOx3, in no acute distress. 

Lungs clear to auscultation, cardiac RRR, abdomen soft, non-tender, repeat 

neuro exam shows no focal findings. 


Diagnostic results d/w the patient in great detail.


Based on history, exam and diagnostic results, plan will be for outpatient 

follow up. 


Patient instructed to follow-up with the clinic  in 1-2 days without fail. 

Return to the emergency room at any time for any new or worsening symptoms. 

Patient states he/she fully agrees with and understands discharge instructions. 

States that he/she agrees with the plan and disposition. Verbalized and 

repeated discharge instructions and plan. I have given the patient opportunity 

to ask any additional questions.





--------------------------------------------------------------------------------

------------------------------------





Scribe Attestation:


Documented by Radha Chanel acting as a scribe for LAMONT Mayo. 





Provider Attestation:


All medical record entries made by the Scribe were at my direction and 

personally dictated by me. I have reviewed the chart and agree that the record 

accurately reflects my personal performance of the history, physical exam, 

medical decision making, and the department course for this patient. I have 

also personally directed, reviewed, and agree with the discharge instructions 

and disposition.








Disposition





- Clinical Impression


Clinical Impression: 


 Dizziness, Asthma








- Patient ED Disposition


Is Patient to be Admitted: No


Counseled Patient/Family Regarding: Studies Performed, Diagnosis, Need For 

Followup





- Disposition


Referrals: 


[facility] [Outside]


Disposition: Routine/Home


Disposition Time: 06:00


Condition: STABLE


Instructions:  Asthma in Adults, Dizziness, Nonvertigo, (DC)


Forms:  [facility] (English)





- PA / NP / Resident Statement


MD/DO has reviewed & agrees with the documentation as recorded.

## 2018-04-16 ENCOUNTER — HOSPITAL ENCOUNTER (EMERGENCY)
Dept: HOSPITAL 14 - H.ER | Age: 38
Discharge: HOME | End: 2018-04-16
Payer: MEDICAID

## 2018-04-16 VITALS
HEART RATE: 97 BPM | SYSTOLIC BLOOD PRESSURE: 138 MMHG | DIASTOLIC BLOOD PRESSURE: 89 MMHG | TEMPERATURE: 97.9 F | OXYGEN SATURATION: 97 %

## 2018-04-16 VITALS — BODY MASS INDEX: 39.4 KG/M2

## 2018-04-16 VITALS — RESPIRATION RATE: 16 BRPM

## 2018-04-16 DIAGNOSIS — E11.9: ICD-10-CM

## 2018-04-16 DIAGNOSIS — I10: ICD-10-CM

## 2018-04-16 DIAGNOSIS — Z79.84: ICD-10-CM

## 2018-04-16 DIAGNOSIS — J45.909: ICD-10-CM

## 2018-04-16 DIAGNOSIS — S92.909A: Primary | ICD-10-CM

## 2018-04-16 NOTE — ED PDOC
Lower Extremity Pain/Injury


Time Seen by Provider: 04/16/18 01:59


Chief Complaint (Nursing): Syncope


Chief Complaint (Provider): foot pain


History Per: Patient


History/Exam Limitations: no limitations


Onset/Duration Of Symptoms: Days (45)


Current Symptoms Are (Timing): Still Present


Severity: None





Past Medical History


Reviewed: Historical Data, Nursing Documentation, Vital Signs


Vital Signs: 





 Last Vital Signs











Temp  98.5 F   04/16/18 01:36


 


Pulse  107 H  04/16/18 01:36


 


Resp  16   04/16/18 01:36


 


BP  189/140 H  04/16/18 01:36


 


Pulse Ox  99   04/16/18 01:36














- Medical History


PMH: Asthma, Diabetes, Fractures (foot fracture), Gastritis, HTN, Pneumonia


   Denies: HIV, Hyperthyroidism, Migraine, Chronic Kidney Disease





- Family History


Family History: States: Unknown Family Hx





- Home Medications


Home Medications: 


 Ambulatory Orders











 Medication  Instructions  Recorded


 


Cholecalciferol [Vitamin D 1000 IU] 1 tab PO QWK 04/05/18


 


Levofloxacin [Levaquin] 500 mg PO DAILY #3 tablet 04/08/18


 


MetFORMIN [glucoPHAGE] 500 mg PO BID #60 tab 04/08/18


 


NIFEdipine ER [Procardia XL] 30 mg PO DAILY #30 ter 04/08/18


 


cloNIDine [Catapres] 0.1 mg PO Q12H #60 tab 04/08/18


 


Ibuprofen [Motrin Tab] 1 tab PO QID #40 tab 04/16/18














- Allergies


Allergies/Adverse Reactions: 


 Allergies











Allergy/AdvReac Type Severity Reaction Status Date / Time


 


Seafood Allergy  SHORTNESS Uncoded 04/14/18 22:51





   OF BREATH  














Review of Systems


ROS Statement: Except As Marked, All Systems Reviewed And Found Negative


Musculoskeletal: Positive for: Foot Pain





Physical Exam





- Reviewed


Nursing Documentation Reviewed: Yes


Vital Signs Reviewed: Yes





- Physical Exam


Appears: Positive for: Well


Head Exam: Positive for: ATRAUMATIC, NORMAL INSPECTION, NORMOCEPHALIC


Skin: Positive for: Normal Color


Neck: Positive for: Normal, Painless ROM, Supple.  Negative for: Decreased ROM


Cardiovascular/Chest: Positive for: Regular Rate, Rhythm, Chest Non Tender.  

Negative for: Edema, Gallop, Bradycardia, Tachycardia, Friction Rub


Respiratory: Positive for: Normal Breath Sounds.  Negative for: Decreased 

Breath Sounds, Accessory Muscle Use


Pulses-Carotid (L): 2+


Pulses-Carotid (R): 2+


Extremity: Positive for: Normal ROM, Tenderness.  Negative for: Pedal Edema





- ECG


O2 Sat by Pulse Oximetry: 99





Medical Decision Making


Medical Decision Making: 





pt lost her surgical boot and does not know what to take for her pain as she 

forgot


new shoe issued


NSAID rx





Disposition





- Clinical Impression


Clinical Impression: 


 Fracture of foot








- Patient ED Disposition


Is Patient to be Admitted: No


Doctor Will See Patient In The: Office


Counseled Patient/Family Regarding: Diagnosis, Need For Followup, Rx Given





- Disposition


Referrals: 


Missael Tate MD [Primary Care Provider] - 


Eugenio Valencia DPM [Resident] - 


Disposition: Routine/Home


Disposition Time: 03:04


Condition: GOOD


Prescriptions: 


Ibuprofen [Motrin Tab] 1 tab PO QID #40 tab


Instructions:  Foot Fracture (DC)


Forms:  [facility] (English)

## 2018-05-02 ENCOUNTER — HOSPITAL ENCOUNTER (EMERGENCY)
Dept: HOSPITAL 14 - H.ER | Age: 38
Discharge: HOME | End: 2018-05-02
Payer: MEDICAID

## 2018-05-02 VITALS
TEMPERATURE: 98.9 F | HEART RATE: 96 BPM | SYSTOLIC BLOOD PRESSURE: 167 MMHG | DIASTOLIC BLOOD PRESSURE: 99 MMHG | RESPIRATION RATE: 18 BRPM

## 2018-05-02 VITALS — BODY MASS INDEX: 39.4 KG/M2

## 2018-05-02 DIAGNOSIS — R51: Primary | ICD-10-CM

## 2018-05-02 DIAGNOSIS — I10: ICD-10-CM

## 2018-05-02 DIAGNOSIS — Z79.4: ICD-10-CM

## 2018-05-02 DIAGNOSIS — J01.00: ICD-10-CM

## 2018-05-02 DIAGNOSIS — J45.909: ICD-10-CM

## 2018-05-02 DIAGNOSIS — E11.9: ICD-10-CM

## 2018-05-02 LAB
ALBUMIN SERPL-MCNC: 4.4 G/DL (ref 3.5–5)
ALBUMIN/GLOB SERPL: 1.3 {RATIO} (ref 1–2.1)
ALT SERPL-CCNC: 31 U/L (ref 9–52)
AST SERPL-CCNC: 22 U/L (ref 14–36)
BASOPHILS # BLD AUTO: 0.1 K/UL (ref 0–0.2)
BASOPHILS NFR BLD: 0.5 % (ref 0–2)
BILIRUB UR-MCNC: NEGATIVE MG/DL
BUN SERPL-MCNC: 15 MG/DL (ref 7–17)
CALCIUM SERPL-MCNC: 9.1 MG/DL (ref 8.4–10.2)
COLOR UR: (no result)
EOSINOPHIL # BLD AUTO: 0.2 K/UL (ref 0–0.7)
EOSINOPHIL NFR BLD: 1.7 % (ref 0–4)
ERYTHROCYTE [DISTWIDTH] IN BLOOD BY AUTOMATED COUNT: 16.4 % (ref 11.5–14.5)
GFR NON-AFRICAN AMERICAN: > 60
GLUCOSE UR STRIP-MCNC: >=500 MG/DL
HGB BLD-MCNC: 12.1 G/DL (ref 12–16)
LEUKOCYTE ESTERASE UR-ACNC: (no result) LEU/UL
LYMPHOCYTES # BLD AUTO: 2.9 K/UL (ref 1–4.3)
LYMPHOCYTES NFR BLD AUTO: 23.8 % (ref 20–40)
MCH RBC QN AUTO: 24.1 PG (ref 27–31)
MCHC RBC AUTO-ENTMCNC: 31.6 G/DL (ref 33–37)
MCV RBC AUTO: 76.3 FL (ref 81–99)
MONOCYTES # BLD: 0.8 K/UL (ref 0–0.8)
MONOCYTES NFR BLD: 7 % (ref 0–10)
NEUTROPHILS # BLD: 8.1 K/UL (ref 1.8–7)
NEUTROPHILS NFR BLD AUTO: 67 % (ref 50–75)
NRBC BLD AUTO-RTO: 0 % (ref 0–0)
PH UR STRIP: 7 [PH] (ref 5–8)
PLATELET # BLD: 197 K/UL (ref 130–400)
PMV BLD AUTO: 9.6 FL (ref 7.2–11.7)
PROT UR STRIP-MCNC: 30 MG/DL
RBC # BLD AUTO: 5.01 MIL/UL (ref 3.8–5.2)
RBC # UR STRIP: (no result) /UL
SP GR UR STRIP: 1.02 (ref 1–1.03)
SQUAMOUS EPITHIAL: 2 /HPF (ref 0–5)
URINE CLARITY: CLEAR
UROBILINOGEN UR-MCNC: (no result) MG/DL (ref 0.2–1)
WBC # BLD AUTO: 12.1 K/UL (ref 4.8–10.8)

## 2018-05-02 PROCEDURE — 99285 EMERGENCY DEPT VISIT HI MDM: CPT

## 2018-05-02 PROCEDURE — 81003 URINALYSIS AUTO W/O SCOPE: CPT

## 2018-05-02 PROCEDURE — 81025 URINE PREGNANCY TEST: CPT

## 2018-05-02 PROCEDURE — 87086 URINE CULTURE/COLONY COUNT: CPT

## 2018-05-02 PROCEDURE — 96374 THER/PROPH/DIAG INJ IV PUSH: CPT

## 2018-05-02 PROCEDURE — 80053 COMPREHEN METABOLIC PANEL: CPT

## 2018-05-02 PROCEDURE — 96372 THER/PROPH/DIAG INJ SC/IM: CPT

## 2018-05-02 PROCEDURE — 70450 CT HEAD/BRAIN W/O DYE: CPT

## 2018-05-02 PROCEDURE — 85025 COMPLETE CBC W/AUTO DIFF WBC: CPT

## 2018-05-02 NOTE — ED PDOC
HPI:  Headache


Time Seen by Provider: 05/02/18 20:05


Chief Complaint (Nursing): Headache


Chief Complaint (Provider): Headache


History Per: Patient


History/Exam Limitations: no limitations


Onset/Duration Of Symptoms: Hrs (x2)


Current Symptoms Are (Timing): Still Present


Associated Symptoms: Nausea


Additional History Per: Patient


Additional Complaint(s): 





38 year old female, with medical history of diabetes and hypertension, 

presented to ED with complaints of progressively worsening headache and nausea 

with onset of 2 hours.  Patient reports vomiting x1 episode and indicates it is 

not a thunderclap headache.  She denies fever, chills, allergies, or history of 

headaches. 





PMD: Missael Tate





Past Medical History


Reviewed: Historical Data, Nursing Documentation, Vital Signs


Vital Signs: 





 Last Vital Signs











Temp  99.3 F   05/02/18 19:59


 


Pulse  92 H  05/02/18 20:55


 


Resp  16   05/02/18 19:59


 


BP  153/85 H  05/02/18 20:55


 


Pulse Ox  95   05/02/18 19:59














- Medical History


PMH: Asthma, Diabetes, Fractures (foot fracture), Gastritis, HTN, Pneumonia


   Denies: HIV, Hyperthyroidism, Migraine, Chronic Kidney Disease





- Surgical History


Surgical History: No Surg Hx





- Family History


Family History: States: Unknown Family Hx





- Social History


Current smoker - smoking cessation education provided: Yes (<10 cigarettes daily

)


Alcohol: None


Drugs: Denies





- Home Medications


Home Medications: 


 Ambulatory Orders











 Medication  Instructions  Recorded


 


Cholecalciferol [Vitamin D 1000 IU] 1 tab PO QWK 04/05/18


 


Levofloxacin [Levaquin] 500 mg PO DAILY #3 tablet 04/08/18


 


MetFORMIN [glucoPHAGE] 500 mg PO BID #60 tab 04/08/18


 


NIFEdipine ER [Procardia XL] 30 mg PO DAILY #30 ter 04/08/18


 


cloNIDine [Catapres] 0.1 mg PO Q12H #60 tab 04/08/18


 


Ibuprofen [Motrin Tab] 1 tab PO QID #40 tab 04/16/18


 


Amoxicillin/Clavulanate [Augmentin 1 tab PO BID #14 tab 05/02/18





875 MG-125 MG]  














- Allergies


Allergies/Adverse Reactions: 


 Allergies











Allergy/AdvReac Type Severity Reaction Status Date / Time


 


Seafood Allergy  SHORTNESS Uncoded 05/02/18 19:59





   OF BREATH  














Review of Systems


ROS Statement: Except As Marked, All Systems Reviewed And Found Negative


Constitutional: Negative for: Fever, Chills


Gastrointestinal: Positive for: Nausea, Vomiting (x1)


Neurological: Positive for: Headache (progressive not thunder clap episode)





Physical Exam





- Reviewed


Nursing Documentation Reviewed: Yes


Vital Signs Reviewed: Yes





- Physical Exam


Appears: Positive for: Non-toxic, No Acute Distress


Head Exam: Positive for: ATRAUMATIC, NORMAL INSPECTION, NORMOCEPHALIC


Skin: Positive for: Normal Color, Warm, Dry


Eye Exam: Positive for: Normal appearance, EOMI, PERRL


Neck: Positive for: Normal, Painless ROM


Cardiovascular/Chest: Positive for: Regular Rate, Rhythm.  Negative for: Murmur


Respiratory: Positive for: Normal Breath Sounds.  Negative for: Wheezing, 

Respiratory Distress


Gastrointestinal/Abdominal: Positive for: Normal Exam, Soft.  Negative for: 

Tenderness


Back: Positive for: Normal Inspection.  Negative for: L CVA Tenderness, R CVA 

Tenderness, Vertebral Tenderness


Extremity: Positive for: Normal ROM (upper/lower).  Negative for: Deformity, 

Swelling


Neurologic/Psych: Positive for: Alert, Oriented (x3), Gait (steady).  Negative 

for: Motor/Sensory Deficits, Aphasia, Facial Droop





- Laboratory Results


Result Diagrams: 


 05/02/18 20:40





 05/02/18 20:40





- ECG


O2 Sat by Pulse Oximetry: 95 (RA)


Pulse Ox Interpretation: Normal





Medical Decision Making


Medical Decision Making: 





Initial Impression: Headache





Initial Plan: 


Head CT w/o contrast


CMP


CBC


Insulin 2 units SC


Sodium chloride 1000mL IV


Reglan 10 mg IV


Urine C&S


Urinalysis





-Given first headache will order head CT


head ct no acute findings


pt has been sleeping throughout ER stay


pt tolerated po and feels improved


stable for dc and outpt follwo up


--------------------------------------------------------------------------------

-----------------


Scribe Attestation:


Documented by Leonel Pham acting as a scribe for Barrett Lepe MD.





Provider Scribe Attestation:


All medical record entries made by the Scribe were at my direction and 

personally dictated by me. I have reviewed the chart and agree that the record 

accurately reflects my personal performance of the history, physical exam, 

medical decision making, and the department course for this patient. I have 

also personally directed, reviewed, and agree with the discharge instructions 

and disposition.





Disposition





- Clinical Impression


Clinical Impression: 


 Acute headache, Sinusitis








- Patient ED Disposition


Is Patient to be Admitted: No


Counseled Patient/Family Regarding: Studies Performed, Diagnosis, Need For 

Followup





- Disposition


Referrals: 


[facility] [Outside]


formerly [facility] [Outside]


Disposition: Routine/Home


Disposition Time: 22:00


Condition: IMPROVED


Additional Instructions: 


follow up with your primary doctor in 1-2 days


return to the ED with any worsening or concerning symptoms


Prescriptions: 


Amoxicillin/Clavulanate [Augmentin 875 MG-125 MG] 1 tab PO BID #14 tab


Instructions:  Sinus Headache (DC), Acute Headache (ED)


Forms:  [facility] (English)

## 2018-05-02 NOTE — CT
EXAM:

  CT Head Without Intravenous Contrast



EXAM DATE/TIME:

  5/2/2018 9:22 PM



CLINICAL HISTORY:

  38 years old, female; Pain; Headache; Headache not specified



TECHNIQUE:

  Axial computed tomography images of the head/brain without intravenous 

contrast.  All CT scans at this facility use one or more dose reduction 

techniques, viz.: automated exposure control; ma/kV adjustment per patient size 

(including targeted exams where dose is matched to indication; i.e. head); or 

iterative reconstruction technique.

  Coronal and sagittal reformatted images were created and reviewed.



COMPARISON:

   Prior head CT of 2018-04-15 



FINDINGS:

  LIMITATIONS:  Mild streak/motion artifact.

  BRAIN:  No acute abnormality identified.  No acute hemorrhage seen within the 

brain.  No acute extra-axial fluid collections visualized.  No evidence of 

significant mass effect within the brain.

  VENTRICLES:  No evidence of significant hydrocephalus.

  BONES/JOINTS:  No acute fractures or other acute bony abnormality noted.

  SOFT TISSUES:  No acute abnormality of the visualized soft tissues is seen.

  SINUSES:  Fluid in the left maxillary sinus, compatible with acute sinusitis. 

There is also mucosal thickening in the bilateral sphenoid and right maxillary 

sinuses.

  MASTOID AIR CELLS:  Mastoid air cells appear clear.



IMPRESSION:     

- No acute findings seen within the brain.

- Acute maxillary sinusitis.

- See above for remaining findings.

## 2018-05-03 ENCOUNTER — HOSPITAL ENCOUNTER (EMERGENCY)
Dept: HOSPITAL 14 - H.ER | Age: 38
LOS: 1 days | Discharge: HOME | End: 2018-05-04
Payer: MEDICAID

## 2018-05-03 VITALS — OXYGEN SATURATION: 95 %

## 2018-05-03 VITALS — BODY MASS INDEX: 39.4 KG/M2

## 2018-05-03 DIAGNOSIS — J02.9: Primary | ICD-10-CM

## 2018-05-03 DIAGNOSIS — J45.909: ICD-10-CM

## 2018-05-03 DIAGNOSIS — I10: ICD-10-CM

## 2018-05-03 DIAGNOSIS — E11.9: ICD-10-CM

## 2018-05-03 DIAGNOSIS — Z79.84: ICD-10-CM

## 2018-05-03 PROCEDURE — 96361 HYDRATE IV INFUSION ADD-ON: CPT

## 2018-05-03 PROCEDURE — 83605 ASSAY OF LACTIC ACID: CPT

## 2018-05-03 PROCEDURE — 80048 BASIC METABOLIC PNL TOTAL CA: CPT

## 2018-05-03 PROCEDURE — 85025 COMPLETE CBC W/AUTO DIFF WBC: CPT

## 2018-05-03 PROCEDURE — 96374 THER/PROPH/DIAG INJ IV PUSH: CPT

## 2018-05-03 PROCEDURE — 99284 EMERGENCY DEPT VISIT MOD MDM: CPT

## 2018-05-03 PROCEDURE — 96375 TX/PRO/DX INJ NEW DRUG ADDON: CPT

## 2018-05-04 VITALS — HEART RATE: 93 BPM | SYSTOLIC BLOOD PRESSURE: 153 MMHG | DIASTOLIC BLOOD PRESSURE: 97 MMHG

## 2018-05-04 VITALS — OXYGEN SATURATION: 96 %

## 2018-05-04 VITALS — RESPIRATION RATE: 18 BRPM

## 2018-05-04 VITALS — TEMPERATURE: 99.2 F

## 2018-05-04 LAB
BASOPHILS # BLD AUTO: 0.2 K/UL (ref 0–0.2)
BASOPHILS NFR BLD: 1.1 % (ref 0–2)
BUN SERPL-MCNC: 11 MG/DL (ref 7–17)
CALCIUM SERPL-MCNC: 9 MG/DL (ref 8.4–10.2)
EOSINOPHIL # BLD AUTO: 0.1 K/UL (ref 0–0.7)
EOSINOPHIL NFR BLD: 0.6 % (ref 0–4)
ERYTHROCYTE [DISTWIDTH] IN BLOOD BY AUTOMATED COUNT: 17.3 % (ref 11.5–14.5)
GFR NON-AFRICAN AMERICAN: > 60
HGB BLD-MCNC: 12.3 G/DL (ref 12–16)
LYMPHOCYTES # BLD AUTO: 2.9 K/UL (ref 1–4.3)
LYMPHOCYTES NFR BLD AUTO: 17.3 % (ref 20–40)
MCH RBC QN AUTO: 24.2 PG (ref 27–31)
MCHC RBC AUTO-ENTMCNC: 31.6 G/DL (ref 33–37)
MCV RBC AUTO: 76.7 FL (ref 81–99)
MONOCYTES # BLD: 1.4 K/UL (ref 0–0.8)
MONOCYTES NFR BLD: 8.4 % (ref 0–10)
NEUTROPHILS # BLD: 12.3 K/UL (ref 1.8–7)
NEUTROPHILS NFR BLD AUTO: 72.6 % (ref 50–75)
NRBC BLD AUTO-RTO: 0 % (ref 0–0)
PLATELET # BLD: 222 K/UL (ref 130–400)
PMV BLD AUTO: 9.8 FL (ref 7.2–11.7)
RBC # BLD AUTO: 5.07 MIL/UL (ref 3.8–5.2)
WBC # BLD AUTO: 16.9 K/UL (ref 4.8–10.8)

## 2018-05-04 NOTE — ED PDOC
HPI: General Adult


Time Seen by Provider: 18 00:26


Chief Complaint (Nursing): Chest Pain


Chief Complaint (Provider): throat pain, fever, bodyaches


History Per: Patient


History/Exam Limitations: no limitations


Onset/Duration Of Symptoms: Hrs


Have you had recent travel within the past 21 days to any of the following 

countries: [place], [place], [place] or [place]?: No


Current Symptoms Are (Timing): Still Present


Additional Complaint(s): 





Hx of obesity, HTN, DM presenting with sore throat, fever x 2 days, was in ER 

yesterday with dizziness, throat pain, had negative workup and was discharged 

with augmentin, patient states she only took one dose and no other medications.

  States she is still feeling dizzy with throat pain, states it extends down to 

her chest and and abdomen as well.  States it is hard for her to swallow and 

has no appetite.  Complaining of bodyaches, also in her feet as well.  





Past Medical History


Reviewed: Historical Data, Nursing Documentation, Vital Signs


Vital Signs: 


 Last Vital Signs











Temp  99.2 F   18 04:03


 


Pulse  93 H  18 05:24


 


Resp  18   18 05:24


 


BP  153/97 H  18 05:24


 


Pulse Ox  96   18 05:24














- Medical History


PMH: Asthma, Diabetes, Fractures (foot fracture), Gastritis, HTN, Pneumonia


   Denies: HIV, Hyperthyroidism, Migraine, Chronic Kidney Disease





- Family History


Family History: States: Unknown Family Hx





- Home Medications


Home Medications: 


 Ambulatory Orders











 Medication  Instructions  Recorded


 


Cholecalciferol [Vitamin D 1000 IU] 1 tab PO QWK 18


 


Levofloxacin [Levaquin] 500 mg PO DAILY #3 tablet 18


 


MetFORMIN [glucoPHAGE] 500 mg PO BID #60 tab 18


 


NIFEdipine ER [Procardia XL] 30 mg PO DAILY #30 ter 18


 


cloNIDine [Catapres] 0.1 mg PO Q12H #60 tab 18


 


Ibuprofen [Motrin Tab] 1 tab PO QID #40 tab 18


 


Amoxicillin/Clavulanate [Augmentin 1 tab PO BID #14 tab 18





875 MG-125 MG]  














- Allergies


Allergies/Adverse Reactions: 


 Allergies











Allergy/AdvReac Type Severity Reaction Status Date / Time


 


Seafood Allergy  SHORTNESS Uncoded 18 19:59





   OF BREATH  














Review of Systems


ROS Statement: Except As Marked, All Systems Reviewed And Found Negative


Constitutional: Positive for: Fever


ENT: Positive for: Throat Pain


Cardiovascular: Positive for: Chest Pain


Neurological: Positive for: Dizziness





Physical Exam





- Reviewed


Nursing Documentation Reviewed: Yes


Vital Signs Reviewed: Yes





- Physical Exam


Appears: Positive for: Well, Non-toxic, No Acute Distress


Head Exam: Positive for: ATRAUMATIC, NORMAL INSPECTION, NORMOCEPHALIC


Skin: Positive for: Normal Color, Warm, DRY


Eye Exam: Positive for: EOMI, Normal appearance, PERRL


ENT: Positive for: Normal ENT Inspection, Pharynx Is (normal, no exudate)


Neck: Positive for: Normal, Painless ROM


Cardiovascular/Chest: Positive for: Regular Rate, Rhythm


Respiratory: Positive for: CNT, Normal Breath Sounds


Gastrointestinal/Abdominal: Positive for: Normal Exam, Soft


Back: Positive for: Normal Inspection


Extremity: Positive for: Normal ROM


Neurologic/Psych: Positive for: Alert, Oriented





- Laboratory Results


Result Diagrams: 


 18 01:31





 18 01:31





- ECG


O2 Sat by Pulse Oximetry: 96


Pulse Ox Interpretation: Normal





Medical Decision Making


Medical Decision MakinAM


A/P: Hx of HTN, DM presenting with throat pain, dizziness


-patient likely has viral pharyngitis v. strep causing symptoms


-will treat w/ toradol, solumedrol


-will recheck labs given tachyardia, low grade oral temp of 100.1


-will reeval





530AM


-Patient has elevated white count, but has only taken on dose of ABx


-patient has been snoring in ER since arrival, has repeatedly removed herself 

from monitor


-Vitals now improved, patient feeling better, well appearing, will discharge


-Followup provided, return precautions given.





Disposition





- Clinical Impression


Clinical Impression: 


 Pharyngitis








- Patient ED Disposition


Is Patient to be Admitted: No





- Disposition


Referrals: 


Missael Tate MD [Primary Care Provider] - 


Disposition: Routine/Home


Disposition Time: 05:46


Condition: IMPROVED


Instructions:  Sore Throat in Adults


Forms:  [facility] (English)

## 2018-05-07 ENCOUNTER — HOSPITAL ENCOUNTER (EMERGENCY)
Dept: HOSPITAL 14 - H.ER | Age: 38
Discharge: HOME | End: 2018-05-07
Payer: MEDICAID

## 2018-05-07 VITALS
RESPIRATION RATE: 18 BRPM | HEART RATE: 107 BPM | OXYGEN SATURATION: 98 % | SYSTOLIC BLOOD PRESSURE: 158 MMHG | DIASTOLIC BLOOD PRESSURE: 87 MMHG | TEMPERATURE: 98.3 F

## 2018-05-07 VITALS — BODY MASS INDEX: 39.4 KG/M2

## 2018-05-07 DIAGNOSIS — M77.32: Primary | ICD-10-CM

## 2018-05-07 DIAGNOSIS — E11.9: ICD-10-CM

## 2018-05-07 DIAGNOSIS — I10: ICD-10-CM

## 2018-05-07 DIAGNOSIS — Z79.84: ICD-10-CM

## 2018-05-07 DIAGNOSIS — J45.909: ICD-10-CM

## 2018-05-07 NOTE — ED PDOC
Lower Extremity Pain/Injury


Time Seen by Provider: 05/07/18 02:29


Chief Complaint (Nursing): Lower Extremity Problem/Injury


History Per: Patient


Additional Complaint(s): 





Pt. c/o atraumatic L foot pain x 1 week. Pt. reports a hx of L foot fracture 

which did not require surgery. Pain is localized to the L heel. Denies numbness

, tingling, rash, fever. 





Past Medical History


Reviewed: Historical Data, Nursing Documentation, Vital Signs


Vital Signs: 





 Last Vital Signs











Temp  98.3 F   05/07/18 02:21


 


Pulse  107 H  05/07/18 02:21


 


Resp  18   05/07/18 02:21


 


BP  158/87 H  05/07/18 02:21


 


Pulse Ox  98   05/07/18 02:21














- Medical History


PMH: Asthma, Diabetes, Fractures (foot fracture), Gastritis, HTN, Pneumonia


   Denies: HIV, Hyperthyroidism, Migraine, Chronic Kidney Disease





- Family History


Family History: States: No Known Family Hx





- Home Medications


Home Medications: 


 Ambulatory Orders











 Medication  Instructions  Recorded


 


Cholecalciferol [Vitamin D 1000 IU] 1 tab PO QWK 04/05/18


 


Levofloxacin [Levaquin] 500 mg PO DAILY #3 tablet 04/08/18


 


MetFORMIN [glucoPHAGE] 500 mg PO BID #60 tab 04/08/18


 


NIFEdipine ER [Procardia XL] 30 mg PO DAILY #30 ter 04/08/18


 


cloNIDine [Catapres] 0.1 mg PO Q12H #60 tab 04/08/18


 


Ibuprofen [Motrin Tab] 1 tab PO QID #40 tab 04/16/18


 


Amoxicillin/Clavulanate [Augmentin 1 tab PO BID #14 tab 05/02/18





875 MG-125 MG]  


 


Naproxen [Naprosyn] 500 mg PO BID PRN #10 tab 05/07/18














- Allergies


Allergies/Adverse Reactions: 


 Allergies











Allergy/AdvReac Type Severity Reaction Status Date / Time


 


Seafood Allergy  SHORTNESS Uncoded 05/02/18 19:59





   OF BREATH  














Review of Systems


ROS Statement: Except As Marked, All Systems Reviewed And Found Negative





Physical Exam





- Physical Exam


Appears: Positive for: Well, Non-toxic, No Acute Distress


Skin: Positive for: Normal Color, Warm.  Negative for: Rash


Eye Exam: Positive for: Normal appearance


Pulses-Dorsalis Pedis (L): 2+


Pulses-Dorsalis Pedis (R): 2+


Extremity: Positive for: Normal ROM (FROM actively of LEFT lower extremity), 

Other (L foot without tenderness, swelling, break in skin integrity, warmth, 

erythema, ulcers, lesions, or deformity).  Negative for: Pedal Edema (b/l), 

Calf Tenderness (b/l), Capillary Refill (< 2 seconds of left lower extremity)


Neurologic/Psych: Positive for: Alert, Oriented, Gait (steady, unassisted).  

Negative for: Aphasia, Facial Droop





- ECG


O2 Sat by Pulse Oximetry: 98





- Radiology


X-Ray: Interpreted by Me (L foot x-ray)


X-Ray Interpretation: Other (heel spur; no fx)





- Progress


ED Course And Treament: 





Tylenol 975mg PO, L foot x-ray ordered.





Disposition





- Clinical Impression


Clinical Impression: 


 Heel spur








- Patient ED Disposition


Is Patient to be Admitted: No





- Disposition


Referrals: 


Missael Tate MD [Primary Care Provider] - 


[facility] [Outside]


Disposition: Routine/Home


Disposition Time: 02:40


Condition: STABLE


Additional Instructions: 


Follow up with your podiatrist for further evaluation.


Return to ED immediately if symptoms worsen. 


Prescriptions: 


Naproxen [Naprosyn] 500 mg PO BID PRN #10 tab


 PRN Reason: Pain


Instructions:  Heel Spurs (DC)


Forms:  [email or web address] [facility] (English)


Print Language: ENGLISH

## 2018-05-07 NOTE — RAD
PROCEDURE:  Left Foot Radiographs.



HISTORY:

pain  



COMPARISON:

None.



FINDINGS:



BONES:

No acute fracture or destructive bony lesion identified. A moderate 

plantar calcaneal spurs identified. 



JOINTS:

Normal. 



SOFT TISSUES:

Normal. 



OTHER FINDINGS:

None.



IMPRESSION:

No acute fracture or dislocation identified.  A moderate plantar 

calcaneal spur is appreciated.

## 2018-05-10 ENCOUNTER — HOSPITAL ENCOUNTER (EMERGENCY)
Dept: HOSPITAL 14 - H.ER | Age: 38
Discharge: HOME | End: 2018-05-10
Payer: MEDICAID

## 2018-05-10 VITALS — TEMPERATURE: 98.6 F | RESPIRATION RATE: 18 BRPM

## 2018-05-10 VITALS — DIASTOLIC BLOOD PRESSURE: 78 MMHG | HEART RATE: 93 BPM | SYSTOLIC BLOOD PRESSURE: 132 MMHG | OXYGEN SATURATION: 100 %

## 2018-05-10 VITALS — BODY MASS INDEX: 39.4 KG/M2

## 2018-05-10 DIAGNOSIS — I10: ICD-10-CM

## 2018-05-10 DIAGNOSIS — E11.9: ICD-10-CM

## 2018-05-10 DIAGNOSIS — Z79.84: ICD-10-CM

## 2018-05-10 DIAGNOSIS — J45.909: ICD-10-CM

## 2018-05-10 DIAGNOSIS — R06.02: ICD-10-CM

## 2018-05-10 DIAGNOSIS — J02.9: Primary | ICD-10-CM

## 2018-05-10 LAB
ALBUMIN SERPL-MCNC: 4.1 G/DL (ref 3.5–5)
ALBUMIN/GLOB SERPL: 1.3 {RATIO} (ref 1–2.1)
ALT SERPL-CCNC: 36 U/L (ref 9–52)
AST SERPL-CCNC: 20 U/L (ref 14–36)
BASOPHILS # BLD AUTO: 0.1 K/UL (ref 0–0.2)
BASOPHILS NFR BLD: 0.8 % (ref 0–2)
BNP SERPL-MCNC: 65.4 PG/ML (ref 0–450)
BUN SERPL-MCNC: 14 MG/DL (ref 7–17)
CALCIUM SERPL-MCNC: 9.2 MG/DL (ref 8.4–10.2)
EOSINOPHIL # BLD AUTO: 0.3 K/UL (ref 0–0.7)
EOSINOPHIL NFR BLD: 2.4 % (ref 0–4)
ERYTHROCYTE [DISTWIDTH] IN BLOOD BY AUTOMATED COUNT: 16 % (ref 11.5–14.5)
GFR NON-AFRICAN AMERICAN: > 60
HGB BLD-MCNC: 12.6 G/DL (ref 12–16)
LYMPHOCYTES # BLD AUTO: 2.7 K/UL (ref 1–4.3)
LYMPHOCYTES NFR BLD AUTO: 23.1 % (ref 20–40)
MCH RBC QN AUTO: 24.3 PG (ref 27–31)
MCHC RBC AUTO-ENTMCNC: 32.2 G/DL (ref 33–37)
MCV RBC AUTO: 75.7 FL (ref 81–99)
MONOCYTES # BLD: 0.8 K/UL (ref 0–0.8)
MONOCYTES NFR BLD: 7.3 % (ref 0–10)
NEUTROPHILS # BLD: 7.7 K/UL (ref 1.8–7)
NEUTROPHILS NFR BLD AUTO: 66.4 % (ref 50–75)
NRBC BLD AUTO-RTO: 0.1 % (ref 0–0)
PLATELET # BLD: 173 K/UL (ref 130–400)
PMV BLD AUTO: 9.6 FL (ref 7.2–11.7)
RBC # BLD AUTO: 5.18 MIL/UL (ref 3.8–5.2)
WBC # BLD AUTO: 11.5 K/UL (ref 4.8–10.8)

## 2018-05-10 NOTE — ED PDOC
HPI: General Adult


Time Seen by Provider: 05/10/18 07:12


Chief Complaint (Nursing): Chest Pain


Chief Complaint (Provider): SOB, sore throat


History Per: Patient


History/Exam Limitations: no limitations


Current Symptoms Are (Timing): Still Present


Severity: Mild


Additional Complaint(s): 





37yo female c/o sore throat, SOB and malaise. 





Past Medical History


Vital Signs: 





 Last Vital Signs











Temp  98.6 F   05/10/18 06:22


 


Pulse  107 H  05/10/18 06:22


 


Resp  18   05/10/18 06:22


 


BP  135/80   05/10/18 06:22


 


Pulse Ox  99   05/10/18 06:22














- Medical History


PMH: Asthma, Diabetes, Fractures (foot fracture), Gastritis, HTN, Pneumonia


   Denies: HIV, Hyperthyroidism, Migraine, Chronic Kidney Disease





- Home Medications


Home Medications: 


 Ambulatory Orders











 Medication  Instructions  Recorded


 


Cholecalciferol [Vitamin D 1000 IU] 1 tab PO QWK 04/05/18


 


Levofloxacin [Levaquin] 500 mg PO DAILY #3 tablet 04/08/18


 


MetFORMIN [glucoPHAGE] 500 mg PO BID #60 tab 04/08/18


 


NIFEdipine ER [Procardia XL] 30 mg PO DAILY #30 ter 04/08/18


 


cloNIDine [Catapres] 0.1 mg PO Q12H #60 tab 04/08/18


 


Ibuprofen [Motrin Tab] 1 tab PO QID #40 tab 04/16/18


 


Amoxicillin/Clavulanate [Augmentin 1 tab PO BID #14 tab 05/02/18





875 MG-125 MG]  


 


Naproxen [Naprosyn] 500 mg PO BID PRN #10 tab 05/07/18


 


Amoxicillin 875 mg PO BID #10 tablet 05/10/18














- Allergies


Allergies/Adverse Reactions: 


 Allergies











Allergy/AdvReac Type Severity Reaction Status Date / Time


 


Seafood Allergy  SHORTNESS Uncoded 05/02/18 19:59





   OF BREATH  














- Laboratory Results


Result Diagrams: 


 05/10/18 07:55





 05/10/18 07:55





- ECG


O2 Sat by Pulse Oximetry: 99





Disposition





- Clinical Impression


Clinical Impression: 


 Pharyngitis








- Disposition


Referrals: 


[facility] [Outside]


Condition: STABLE


Prescriptions: 


Amoxicillin 875 mg PO BID #10 tablet


Instructions:  Sore Throat in Adults


Forms:  [employer] (English)

## 2018-05-19 ENCOUNTER — HOSPITAL ENCOUNTER (OUTPATIENT)
Dept: HOSPITAL 14 - H.ER | Age: 38
Setting detail: OBSERVATION
Discharge: LEFT BEFORE DISCHARGE | End: 2018-05-19
Attending: INTERNAL MEDICINE | Admitting: INTERNAL MEDICINE
Payer: MEDICAID

## 2018-05-19 VITALS — TEMPERATURE: 98.9 F | HEART RATE: 89 BPM | DIASTOLIC BLOOD PRESSURE: 93 MMHG | SYSTOLIC BLOOD PRESSURE: 189 MMHG

## 2018-05-19 VITALS — BODY MASS INDEX: 39.4 KG/M2

## 2018-05-19 VITALS — OXYGEN SATURATION: 100 %

## 2018-05-19 VITALS — RESPIRATION RATE: 20 BRPM

## 2018-05-19 DIAGNOSIS — R53.1: ICD-10-CM

## 2018-05-19 DIAGNOSIS — F17.210: ICD-10-CM

## 2018-05-19 DIAGNOSIS — Z91.013: ICD-10-CM

## 2018-05-19 DIAGNOSIS — R29.810: ICD-10-CM

## 2018-05-19 DIAGNOSIS — I10: ICD-10-CM

## 2018-05-19 DIAGNOSIS — E11.9: ICD-10-CM

## 2018-05-19 DIAGNOSIS — K29.70: ICD-10-CM

## 2018-05-19 DIAGNOSIS — R42: Primary | ICD-10-CM

## 2018-05-19 LAB
ALBUMIN SERPL-MCNC: 4 G/DL (ref 3.5–5)
ALBUMIN/GLOB SERPL: 1.1 {RATIO} (ref 1–2.1)
ALT SERPL-CCNC: 36 U/L (ref 9–52)
APTT BLD: 38 SECONDS (ref 25.6–37.1)
AST SERPL-CCNC: 23 U/L (ref 14–36)
BASOPHILS # BLD AUTO: 0.2 K/UL (ref 0–0.2)
BASOPHILS NFR BLD: 1.4 % (ref 0–2)
BUN SERPL-MCNC: 15 MG/DL (ref 7–17)
CALCIUM SERPL-MCNC: 9.3 MG/DL (ref 8.4–10.2)
EOSINOPHIL # BLD AUTO: 0.2 K/UL (ref 0–0.7)
EOSINOPHIL NFR BLD: 1.9 % (ref 0–4)
ERYTHROCYTE [DISTWIDTH] IN BLOOD BY AUTOMATED COUNT: 16.1 % (ref 11.5–14.5)
GFR NON-AFRICAN AMERICAN: > 60
HDLC SERPL-MCNC: 33 MG/DL (ref 30–70)
HGB BLD-MCNC: 12 G/DL (ref 12–16)
INR PPP: 1 (ref 0.9–1.2)
LDLC SERPL-MCNC: 100 MG/DL (ref 0–129)
LYMPHOCYTES # BLD AUTO: 2.8 K/UL (ref 1–4.3)
LYMPHOCYTES NFR BLD AUTO: 25.3 % (ref 20–40)
MCH RBC QN AUTO: 24.7 PG (ref 27–31)
MCHC RBC AUTO-ENTMCNC: 32.6 G/DL (ref 33–37)
MCV RBC AUTO: 75.7 FL (ref 81–99)
MONOCYTES # BLD: 0.7 K/UL (ref 0–0.8)
MONOCYTES NFR BLD: 6.2 % (ref 0–10)
NEUTROPHILS # BLD: 7.3 K/UL (ref 1.8–7)
NEUTROPHILS NFR BLD AUTO: 65.2 % (ref 50–75)
NRBC BLD AUTO-RTO: 0 % (ref 0–0)
PLATELET # BLD: 182 K/UL (ref 130–400)
PMV BLD AUTO: 9.7 FL (ref 7.2–11.7)
PROTHROMBIN TIME: 10.6 SECONDS (ref 9.8–13.1)
RBC # BLD AUTO: 4.86 MIL/UL (ref 3.8–5.2)
WBC # BLD AUTO: 11.1 K/UL (ref 4.8–10.8)

## 2018-05-19 PROCEDURE — 81025 URINE PREGNANCY TEST: CPT

## 2018-05-19 PROCEDURE — 86900 BLOOD TYPING SEROLOGIC ABO: CPT

## 2018-05-19 PROCEDURE — 71045 X-RAY EXAM CHEST 1 VIEW: CPT

## 2018-05-19 PROCEDURE — 80053 COMPREHEN METABOLIC PANEL: CPT

## 2018-05-19 PROCEDURE — 84484 ASSAY OF TROPONIN QUANT: CPT

## 2018-05-19 PROCEDURE — 70498 CT ANGIOGRAPHY NECK: CPT

## 2018-05-19 PROCEDURE — 85610 PROTHROMBIN TIME: CPT

## 2018-05-19 PROCEDURE — 70496 CT ANGIOGRAPHY HEAD: CPT

## 2018-05-19 PROCEDURE — 93005 ELECTROCARDIOGRAM TRACING: CPT

## 2018-05-19 PROCEDURE — 85025 COMPLETE CBC W/AUTO DIFF WBC: CPT

## 2018-05-19 PROCEDURE — 80061 LIPID PANEL: CPT

## 2018-05-19 PROCEDURE — 83036 HEMOGLOBIN GLYCOSYLATED A1C: CPT

## 2018-05-19 PROCEDURE — 99284 EMERGENCY DEPT VISIT MOD MDM: CPT

## 2018-05-19 PROCEDURE — 86850 RBC ANTIBODY SCREEN: CPT

## 2018-05-19 PROCEDURE — 96374 THER/PROPH/DIAG INJ IV PUSH: CPT

## 2018-05-19 PROCEDURE — 85730 THROMBOPLASTIN TIME PARTIAL: CPT

## 2018-05-19 PROCEDURE — 70450 CT HEAD/BRAIN W/O DYE: CPT

## 2018-05-19 PROCEDURE — 82948 REAGENT STRIP/BLOOD GLUCOSE: CPT

## 2018-05-19 NOTE — CARD
--------------- APPROVED REPORT --------------





EKG Measurement

Heart [id number]

MI [id number]

[id number]

[id number]

[id number]



<Conclusion>

Normal sinus rhythm

Biatrial enlargement

Nonspecific T wave abnormality

Abnormal ECG

## 2018-05-19 NOTE — CT
PROCEDURE:  CT HEAD WITHOUT CONTRAST.



HISTORY:

code stroke



COMPARISON:

5/15/2018 



TECHNIQUE:

Axial computed tomography images were obtained through the head/brain 

without intravenous contrast.  



Radiation dose:



Total exam DLP = 938.24 mGy-cm.



This CT exam was performed using one or more of the following dose 

reduction techniques: Automated exposure control, adjustment of the 

mA and/or kV according to patient size, and/or use of iterative 

reconstruction technique.



FINDINGS:



HEMORRHAGE:

No intracranial hemorrhage. 



BRAIN:

No mass effect or edema.  No atrophy.  No chronic white matter 

ischemic change. No evidence of acute infarct. Normal gray/ white 

matter differentiation is preserved.



VENTRICLES:

Unremarkable. No hydrocephalus. 



CALVARIUM:

Unremarkable.



PARANASAL SINUSES:

Chronic sphenoid and right maxillary sinusitis.



MASTOID AIR CELLS:

Unremarkable as visualized. No inflammatory changes.



OTHER FINDINGS:

None.



IMPRESSION:

No evidence of acute infarct.  Chronic sphenoid and right maxillary 

sinusitis. The examination is otherwise unremarkable.

## 2018-05-19 NOTE — ED PDOC
HPI:STROKE





- Time


Time: 12:53





- Historian


Historian: Patient





- Chief Complaint


Chief Complaint: Weakness





- Onset


Date: 05/19/18


Time: 11:20





- Timing


Timing: Currently Symptomatic





- TPA


Positive for Contraindication: Yes


Reason tPA is not being Administered: Does not meet criteria per neurologist 

evaluation and exam.





- Notes:


Notes:: 





Pt. was walking and suddenly got light-headed and room spinning sensation.  

Then pt. felt weak all over.  Pt. came to the ER accordingly.  Denies any 

numbness, tingles, headaches, vision changes, chest pain, dyspnea, abd pain.  

No syncope.  





NIHSS Stroke Scale





- Date/Time Evaluation Performed


Date Performed: 05/19/18


Time Performed: 12:53


When Was NIHSS Performed: Baseline





- How Severe is the Stroke


Level of Consciousness: 0=Alert


LOC to Questions: 0=Both comments correct


LOC to commands: 0=Obeys both correctly


Best Gaze: 0=Normal


Visual: 0=No visual loss


Facial: 2=Partial (lower face paralysis)


Motor Arm - Left: 0=No drift


Motor Arm - Right: 2=Falls before 10 sec


Motor Leg - Left: 0=No drift


Motor Leg - Right: 2=Falls before 5 sec


Limb Ataxia: 1=Present Upper or Lower


Sensory: 1=Mild to moderate loss


Best Language: 0=No aphasia


Dysarthia: 0=Normal articulation


Extinction & Inattention (Neglect): 0=Normal, no object


Score: 8





rTPA Inclusion/Exclusion





- Refusal of Treatment


Patient Refused Treatment: No





- Inclusion Criteria for Altepase


Patient is 18 years or Older: Yes


The Clinical Diagnosis of Ischemic Stroke That is Causing a Potentially 

Disabling Neurological Deficit: No


Time of Onset is Well Established to be Less Than 270 Minute Before Treatment 

Would Begin: Yes


Risk/Benefit Discussed With Patient/Family Member Present: No





Past Medical History


Vital Signs: 





 Last Vital Signs











Temp  98.6 F   05/19/18 12:41


 


Pulse  99 H  05/19/18 12:41


 


Resp  16   05/19/18 12:41


 


BP  166/129 H  05/19/18 12:41


 


Pulse Ox  100   05/19/18 12:41














- Medical History


PMH: Asthma, Diabetes, Gastritis, HTN, Pneumonia


   Denies: HIV, Hyperthyroidism, Migraine, Chronic Kidney Disease





- Family History


Family History: States: Unknown Family Hx





- Home Medications


Home Medications: 


 Ambulatory Orders











 Medication  Instructions  Recorded


 


Cholecalciferol [Vitamin D 1000 IU] 1 tab PO QWK 04/05/18


 


Levofloxacin [Levaquin] 500 mg PO DAILY #3 tablet 04/08/18


 


MetFORMIN [glucoPHAGE] 500 mg PO BID #60 tab 04/08/18


 


NIFEdipine ER [Procardia XL] 30 mg PO DAILY #30 ter 04/08/18


 


cloNIDine [Catapres] 0.1 mg PO Q12H #60 tab 04/08/18


 


Ibuprofen [Motrin Tab] 1 tab PO QID #40 tab 04/16/18


 


Amoxicillin/Clavulanate [Augmentin 1 tab PO BID #14 tab 05/02/18





875 MG-125 MG]  


 


Naproxen [Naprosyn] 500 mg PO BID PRN #10 tab 05/07/18


 


Amoxicillin 875 mg PO BID #10 tablet 05/10/18














- Allergies


Allergies/Adverse Reactions: 


 Allergies











Allergy/AdvReac Type Severity Reaction Status Date / Time


 


Seafood Allergy  SHORTNESS Uncoded 05/02/18 19:59





   OF BREATH  














Review of Systems


ROS Statement: Except As Marked, All Systems Reviewed And Found Negative


Constitutional: Positive for: Weakness


Neurological: Positive for: Weakness, Dizziness





Physical Exam





- Reviewed


Nursing Documentation Reviewed: Yes


Vital Signs Reviewed: Yes





- Physical Exam


Appears: Positive for: Non-toxic, No Acute Distress


Head Exam: Positive for: ATRAUMATIC, NORMAL INSPECTION, NORMOCEPHALIC


Skin: Positive for: Normal Color, Warm, DRY


Eye Exam: Positive for: EOMI, PERRL, Other (mild right eye droop corrected with 

smiling)


ENT: Negative for: Nasal Congestion, Pharyngeal Erythema, Tonsillar Exudate


Neck: Positive for: Normal, Painless ROM, Supple


Cardiovascular/Chest: Positive for: Regular Rate, Rhythm


Respiratory: Positive for: CNT, Normal Breath Sounds


Gastrointestinal/Abdominal: Positive for: Normal Exam, Soft.  Negative for: 

Tenderness


Back: Positive for: Normal Inspection.  Negative for: L CVA Tenderness, R CVA 

Tenderness


Extremity: Negative for: Tenderness, Pedal Edema, Calf Tenderness


Neurologic/Psych: Positive for: Alert, CNs II-XII (except lower R facial droop 

minor; R eyelid droop mild that is corrected on smiling), Oriented, Motor/

Sensory Deficits (R upper and lower extremity 3/5), Gait (abnormal), Facial 

Droop (R lower facial minor dropp).  Negative for: Aphasia





- Laboratory Results


Interpretation Of Abn Labs: no acute





- ECG


ECG: Positive for: Interpreted By Me, Viewed By Me


ECG Rhythm: Positive for: Normal QRS, Sinus Rhythm, Nonspecific Changes


O2 Sat by Pulse Oximetry: 100


Pulse Ox Interpretation: Normal





- CT Scan/US


  ** ct


Other Rad Studies (CT/US): Read By Radiologist


Other Rad Interpretation: no acute





- Progress


ED Course And Treament: 





1240:  Having trouble with IV access for CTA.





1310:  IV established.





1318:  Stable.  Dr. Hair evaluated pt. while at CT via [employer] on provider 

phone as telestroke machine not working.  Dr. Hair states pt. does not meet 

criteria for thrombolytics.   Pt. raising both hands with no issues.  Smiling.  





1426:  Pt. walked to the bathroom with no issues.  Still dizzy.  Will give 

ativan as pt. failed swallow evaluation per onset of symptoms.  Spoke with Dr. Barcenas.  Will admit and give further orders when pt. reaches floor. AAOx3.  





Disposition





- Clinical Impression


Clinical Impression: 


 Dizziness, TIA (transient ischemic attack)








- Patient ED Disposition


Is Patient to be Admitted: Yes


Counseled Patient/Family Regarding: Studies Performed, Diagnosis





- Disposition


Disposition Time: 14:30


Condition: FAIR





- Pt Status Changed To:


Hospital Disposition Of: Observation





- POA


Present On Arrival: None

## 2018-05-19 NOTE — CP.PCM.CON
History of Present Illness





- History of Present Illness


History of Present Illness: 





  38 yr old woman who I was called on for code stroke and was evaluated 

remotely on video call. Miss Malik was deemed not to be a TPA candidate as 

her nIH stroke scale was low, and zero in my opinion. She presented with right 

sided facial droop, baseline right eye ptosis and weakness of right upper and 

lower limb, that resolved on my evaluation.  Her speech was normal and she had 

equal strength and was able to follow commands well. She was not given TPA and 

is now admitted to [facility]. History is as follows: patient became dizzy, and felt 

weak all ocer. 








PMH/PSH: Asthma, Diabetes, Gastritis, HTN, Pneumonia


   Denies: HIV, Hyperthyroidism, Migraine, Chronic Kidney Disease


FH/SH: non contributory


All: seafood





on exam: 


aaox3. PERRL. CN 2-12 normal. EOMI. 


speech fluent. Can name and repeat. 


Motor: strength is normal, sensory: intact ft, pin position sense


+2 dtr ul and ll bl. Toes downgoing. No clonus. 


Gait normal. 





Past Patient History





- Past Medical History & Family History


Past Medical History?: Yes





- Past Social History


Smoking Status: Light Smoker < 10 Cigarettes Daily





- CARDIAC


Hx Hypertension: Yes





- PULMONARY


Hx Asthma: Yes


Hx Pneumonia: Yes





- NEUROLOGICAL


Hx Migraine: No





- HEENT


Hx HEENT Problems: No





- RENAL


Hx Chronic Kidney Disease: No





- ENDOCRINE/METABOLIC


Hx Hyperthyroidism: No





- HEMATOLOGICAL/ONCOLOGICAL


Hx Human Immunodeficiency Virus (HIV): No





- INTEGUMENTARY


Hx Dermatological Problems: No





- MUSCULOSKELETAL/RHEUMATOLOGICAL


Hx Fractures: Yes (foot fracture)





- GASTROINTESTINAL


Hx Gastritis: Yes





- GENITOURINARY/GYNECOLOGICAL


Hx Genitourinary Disorders: No





- PSYCHIATRIC


Hx Psychophysiologic Disorder: No


Hx Substance Use: No





- SURGICAL HISTORY


Hx Surgeries: No





- ANESTHESIA


Hx Anesthesia: No


Hx Anesthesia Reactions: No


Hx Malignant Hyperthermia: No





Meds


Allergies/Adverse Reactions: 


 Allergies











Allergy/AdvReac Type Severity Reaction Status Date / Time


 


Seafood Allergy  SHORTNESS Uncoded 05/02/18 19:59





   OF BREATH  














- Medications


Medications: 


 Current Medications





Sodium Chloride (Sodium Chloride 0.9%)  1,000 mls @ 100 mls/hr IV .Q10H DAVIS


   Last Admin: 05/19/18 13:43 Dose:  100 mls/hr











Results





- Vital Signs


Recent Vital Signs: 


 Last Vital Signs











Temp  98.6 F   05/19/18 12:41


 


Pulse  99 H  05/19/18 12:41


 


Resp  16   05/19/18 12:41


 


BP  166/129 H  05/19/18 12:41


 


Pulse Ox  100   05/19/18 14:30














- Labs


Result Diagrams: 


 05/19/18 13:18





 05/19/18 13:18


Labs: 


 Laboratory Results - last 24 hr











  05/19/18 05/19/18 05/19/18





  13:13 13:18 13:18


 


WBC   11.1 H 


 


RBC   4.86 


 


Hgb   12.0 


 


Hct   36.8 


 


MCV   75.7 L 


 


MCH   24.7 L 


 


MCHC   32.6 L 


 


RDW   16.1 H 


 


Plt Count   182 


 


MPV   9.7 


 


Neut % (Auto)   65.2 


 


Lymph % (Auto)   25.3 


 


Mono % (Auto)   6.2 


 


Eos % (Auto)   1.9 


 


Baso % (Auto)   1.4 


 


Neut # (Auto)   7.3 H 


 


Lymph # (Auto)   2.8 


 


Mono # (Auto)   0.7 


 


Eos # (Auto)   0.2 


 


Baso # (Auto)   0.2 


 


PT   


 


INR   


 


APTT   


 


Sodium    142


 


Potassium    3.9


 


Chloride    103


 


Carbon Dioxide    24


 


Anion Gap    19


 


BUN    15


 


Creatinine    0.9


 


Est GFR ( Amer)    > 60


 


Est GFR (Non-Af Amer)    > 60


 


Random Glucose    159 H


 


Calcium    9.3


 


Total Bilirubin    0.5


 


AST    23


 


ALT    36


 


Alkaline Phosphatase    74


 


Troponin I    < 0.0120


 


Total Protein    7.5


 


Albumin    4.0


 


Globulin    3.5


 


Albumin/Globulin Ratio    1.1


 


Triglycerides    167 H


 


Cholesterol    167


 


LDL Cholesterol Direct    100


 


HDL Cholesterol    33


 


Blood Type  B POSITIVE  


 


Antibody Screen  Negative  


 


BBK History Checked  Patient has bt  














  05/19/18





  13:18


 


WBC 


 


RBC 


 


Hgb 


 


Hct 


 


MCV 


 


MCH 


 


MCHC 


 


RDW 


 


Plt Count 


 


MPV 


 


Neut % (Auto) 


 


Lymph % (Auto) 


 


Mono % (Auto) 


 


Eos % (Auto) 


 


Baso % (Auto) 


 


Neut # (Auto) 


 


Lymph # (Auto) 


 


Mono # (Auto) 


 


Eos # (Auto) 


 


Baso # (Auto) 


 


PT  10.6


 


INR  1.0


 


APTT  38.0 H


 


Sodium 


 


Potassium 


 


Chloride 


 


Carbon Dioxide 


 


Anion Gap 


 


BUN 


 


Creatinine 


 


Est GFR ( Amer) 


 


Est GFR (Non-Af Amer) 


 


Random Glucose 


 


Calcium 


 


Total Bilirubin 


 


AST 


 


ALT 


 


Alkaline Phosphatase 


 


Troponin I 


 


Total Protein 


 


Albumin 


 


Globulin 


 


Albumin/Globulin Ratio 


 


Triglycerides 


 


Cholesterol 


 


LDL Cholesterol Direct 


 


HDL Cholesterol 


 


Blood Type 


 


Antibody Screen 


 


BBK History Checked 














- Imaging and Cardiology


  ** CT scan - head


Status: Image reviewed by me, Report reviewed by me (normal ct head, no 

hemorrhage, no stroke. )





Assessment & Plan





- Assessment and Plan (Free Text)


Assessment: 





38 yr old woman who may have had VBI, or tia, but is not TPA candidate. She 

will get stroke workup. 





plan: 


1. ECHO


2. aspirin 325 mg po daily. 


3. PT/ST/OT

## 2018-05-19 NOTE — RAD
HISTORY:

Code Stroke  



COMPARISON:

4/15/2018 



FINDINGS:



LUNGS:

No active pulmonary disease.



PLEURA:

No significant pleural effusion identified, no pneumothorax apparent.



CARDIOVASCULAR:

Normal.



OSSEOUS STRUCTURES:

No significant abnormalities.



VISUALIZED UPPER ABDOMEN:

Normal.



OTHER FINDINGS:

None.



IMPRESSION:

No active disease.

## 2018-05-20 NOTE — CT
PROCEDURE:  CTA of the brain



HISTORY:

code stroke



COMPARISON:

Comparison is made with the previous CT of the head



TECHNIQUE:

CTA of the neck and brain were obtained after IV contrast 

administration.



Total IV contrast injection 99 cc of Visipaque 320.  



Total exam DLP :8-3.6



FINDINGS:

Please see the report of the CTA of the neck for the findings.



IMPRESSION:

Please refer to the CTA of the neck for the complete report.

## 2018-05-20 NOTE — CP.PCM.PN
Subjective





- Date & Time of Evaluation


Date of Evaluation: 05/19/18


Time of Evaluation: 20:30





- Subjective


Subjective: 


I was called by the RN, Earl when patient refused to stay in the hospital as 

she was NPO after  she failed bed side swallow evaluation. She insisted in 

eating and was refused. Also Unable to do repeat swallow evaluation after hour. 

I have spoken to the patient telephonically and she is fully oriented. She 

refused to stay despite explaining to her of major Complications including 

Major stroke and even Death.





Objective





- Vital Signs/Intake and Output


Vital Signs (last 24 hours): 


 











Temp Pulse Resp BP Pulse Ox


 


 98.9 F   89   20   189/93 H  100 


 


 05/19/18 23:55  05/19/18 23:55  05/19/18 23:55  05/19/18 23:55  05/19/18 16:15











- Medications


Medications: 


 Current Medications





Aspirin (Aspirin Supp)  300 mg MI DAILY DAVIS


Hydralazine HCl (Apresoline)  10 mg IV Q6 PRN


   PRN Reason: high bp


Insulin Human Lispro (Humalog)  0 units SC ACHS DAVIS


   PRN Reason: Protocol











- Labs


Labs: 


 





 05/19/18 13:18 





 05/19/18 13:18 





 











PT  10.6 Seconds (9.8-13.1)   05/19/18  13:18    


 


INR  1.0  (0.9-1.2)   05/19/18  13:18    


 


APTT  38.0 Seconds (25.6-37.1)  H  05/19/18  13:18

## 2018-05-30 ENCOUNTER — HOSPITAL ENCOUNTER (EMERGENCY)
Dept: HOSPITAL 14 - H.ER | Age: 38
Discharge: HOME | End: 2018-05-30
Payer: MEDICAID

## 2018-05-30 VITALS
RESPIRATION RATE: 17 BRPM | OXYGEN SATURATION: 99 % | DIASTOLIC BLOOD PRESSURE: 82 MMHG | HEART RATE: 88 BPM | TEMPERATURE: 98.9 F | SYSTOLIC BLOOD PRESSURE: 133 MMHG

## 2018-05-30 VITALS — BODY MASS INDEX: 39.4 KG/M2

## 2018-05-30 DIAGNOSIS — I10: ICD-10-CM

## 2018-05-30 DIAGNOSIS — J98.01: Primary | ICD-10-CM

## 2018-05-30 DIAGNOSIS — Z79.84: ICD-10-CM

## 2018-05-30 DIAGNOSIS — E11.9: ICD-10-CM

## 2018-05-30 DIAGNOSIS — F17.200: ICD-10-CM

## 2018-05-30 DIAGNOSIS — J45.909: ICD-10-CM

## 2018-05-30 NOTE — ED PDOC
HPI: CCC, URI, Sore Throat


Time Seen by Provider: 05/30/18 04:45


Chief Complaint (Nursing): Cough, Cold, Congestion


Chief Complaint (Provider): cough


History Per: Patient


History/Exam Limitations: no limitations


Onset/Duration Of Symptoms: Days (4)


Current Symptoms Are (Timing): Still Present


Associated Symptoms: Cough, Sputum


Additional Complaint(s): 





39 y/o female presents with cough x 4 days.  Associated wheezing, states cough 

sometimes produces white sputum.  Denies fever, ear pain, throat pain, nasal 

congestion, chest pain, shortness of breath, palpitations, leg pain/swelling.  

Patient requesting prednisone.





Past Medical History


Reviewed: Historical Data, Nursing Documentation, Vital Signs


Vital Signs: 


 Last Vital Signs











Temp  99.0 F   05/30/18 04:45


 


Pulse  92 H  05/30/18 04:45


 


Resp  16   05/30/18 04:45


 


BP  143/82   05/30/18 04:45


 


Pulse Ox  100   05/30/18 04:58














- Medical History


PMH: Asthma, Diabetes, Fractures (foot fracture), Gastritis, HTN, Pneumonia


   Denies: HIV, Hyperthyroidism, Migraine, Chronic Kidney Disease





- Family History


Family History: States: Unknown Family Hx





- Living Arrangements


Living Arrangements: Alone





- Social History


Current smoker - smoking cessation education provided: Yes


Alcohol: None


Drugs: Denies





- Home Medications


Home Medications: 


 Ambulatory Orders











 Medication  Instructions  Recorded


 


Cholecalciferol [Vitamin D 1000 IU] 1 tab PO QWK 04/05/18


 


Levofloxacin [Levaquin] 500 mg PO DAILY #3 tablet 04/08/18


 


MetFORMIN [glucoPHAGE] 500 mg PO BID #60 tab 04/08/18


 


NIFEdipine ER [Procardia XL] 30 mg PO DAILY #30 ter 04/08/18


 


cloNIDine [Catapres] 0.1 mg PO Q12H #60 tab 04/08/18


 


Ibuprofen [Motrin Tab] 1 tab PO QID #40 tab 04/16/18


 


Amoxicillin/Clavulanate [Augmentin 1 tab PO BID #14 tab 05/02/18





875 MG-125 MG]  


 


Naproxen [Naprosyn] 500 mg PO BID PRN #10 tab 05/07/18


 


Amoxicillin 875 mg PO BID #10 tablet 05/10/18


 


Albuterol HFA [Ventolin HFA 90 1 puff IH Q4 PRN #1 inh 05/30/18





mcg/actuation (8 g)]  


 


predniSONE [Prednisone] 60 mg PO DAILY #12 tab 05/30/18














- Allergies


Allergies/Adverse Reactions: 


 Allergies











Allergy/AdvReac Type Severity Reaction Status Date / Time


 


Seafood Allergy  SHORTNESS Uncoded 05/02/18 19:59





   OF BREATH  














Review of Systems


ROS Statement: Except As Marked, All Systems Reviewed And Found Negative


Respiratory: Positive for: Cough, Sputum, Wheezing





Physical Exam





- Reviewed


Nursing Documentation Reviewed: Yes


Vital Signs Reviewed: Yes





- Physical Exam


Appears: Positive for: Well, Non-toxic, No Acute Distress (sleeping)


Head Exam: Positive for: ATRAUMATIC, NORMAL INSPECTION, NORMOCEPHALIC


Skin: Positive for: Normal Color


Eye Exam: Positive for: Normal appearance


ENT: Positive for: Normal ENT Inspection


Cardiovascular/Chest: Positive for: Regular Rate, Rhythm


Respiratory: Positive for: Wheezing (diffuse expiratory wheezing).  Negative for

: Accessory Muscle Use, Respiratory Distress


Gastrointestinal/Abdominal: Positive for: Normal Exam


Back: Positive for: Normal Inspection


Extremity: Positive for: Normal ROM


Neurologic/Psych: Positive for: Alert, Oriented





- ECG


O2 Sat by Pulse Oximetry: 100





- Radiology


X-Ray: Viewed By Me


X-Ray Interpretation: No Acute Disease





- Progress


ED Course And Treament: 





xray, duonebs, prednisone PO





Patient educated on findings, discharged with rx prednsione, albuterol hfa


Advised smoking cessation


Follow up PMD 2-3 days.


Return precautions given





Disposition





- Clinical Impression


Clinical Impression: 


 Bronchospasm








- Patient ED Disposition


Is Patient to be Admitted: No


Counseled Patient/Family Regarding: Studies Performed, Diagnosis, Need For 

Followup, Rx Given





- Disposition


Disposition: Routine/Home


Disposition Time: 05:48


Condition: IMPROVED


Prescriptions: 


Albuterol HFA [Ventolin HFA 90 mcg/actuation (8 g)] 1 puff IH Q4 PRN #1 inh


 PRN Reason: Wheezing


predniSONE [Prednisone] 60 mg PO DAILY #12 tab


Instructions:  Asthma in Adults


Forms:  [facility] (English)

## 2018-05-30 NOTE — RAD
HISTORY:

cough  



COMPARISON:

Frontal chest radiograph 05/19/2018.



TECHNIQUE:

Chest PA and lateral



FINDINGS:



LUNGS:

No active pulmonary disease.



PLEURA:

No significant pleural effusion identified. No pneumothorax apparent.



CARDIOVASCULAR:

Normal.



OSSEOUS STRUCTURES:

No significant abnormalities.



VISUALIZED UPPER ABDOMEN:

Normal.



OTHER FINDINGS:

None.



IMPRESSION:

No interval acute cardiopulmonary disease appreciated.

## 2018-06-04 ENCOUNTER — HOSPITAL ENCOUNTER (EMERGENCY)
Dept: HOSPITAL 14 - H.ER | Age: 38
Discharge: HOME | End: 2018-06-04
Payer: MEDICAID

## 2018-06-04 VITALS — OXYGEN SATURATION: 100 %

## 2018-06-04 VITALS — RESPIRATION RATE: 18 BRPM

## 2018-06-04 VITALS — BODY MASS INDEX: 39.4 KG/M2

## 2018-06-04 VITALS — DIASTOLIC BLOOD PRESSURE: 58 MMHG | HEART RATE: 85 BPM | SYSTOLIC BLOOD PRESSURE: 155 MMHG

## 2018-06-04 VITALS — TEMPERATURE: 97.8 F

## 2018-06-04 DIAGNOSIS — J45.909: ICD-10-CM

## 2018-06-04 DIAGNOSIS — R51: Primary | ICD-10-CM

## 2018-06-04 DIAGNOSIS — Z79.84: ICD-10-CM

## 2018-06-04 DIAGNOSIS — Z59.0: ICD-10-CM

## 2018-06-04 DIAGNOSIS — F17.200: ICD-10-CM

## 2018-06-04 DIAGNOSIS — E11.9: ICD-10-CM

## 2018-06-04 DIAGNOSIS — J98.01: ICD-10-CM

## 2018-06-04 SDOH — ECONOMIC STABILITY - HOUSING INSECURITY: HOMELESSNESS: Z59.0

## 2018-06-04 NOTE — CARD
--------------- APPROVED REPORT --------------





EKG Measurement

Heart [id number]

DC 138P54

[id number]

[id number]

[id number]



<Conclusion>

Normal sinus rhythm

Biatrial enlargement

Cannot rule out Anterior infarct, age undetermined

Abnormal ECG

## 2018-06-12 ENCOUNTER — HOSPITAL ENCOUNTER (EMERGENCY)
Dept: HOSPITAL 14 - H.ER | Age: 38
Discharge: HOME | End: 2018-06-12
Payer: MEDICAID

## 2018-06-12 VITALS — DIASTOLIC BLOOD PRESSURE: 85 MMHG | SYSTOLIC BLOOD PRESSURE: 151 MMHG | RESPIRATION RATE: 18 BRPM

## 2018-06-12 VITALS — BODY MASS INDEX: 39.4 KG/M2

## 2018-06-12 VITALS — HEART RATE: 89 BPM | OXYGEN SATURATION: 97 %

## 2018-06-12 VITALS — TEMPERATURE: 97.1 F

## 2018-06-12 DIAGNOSIS — Z59.0: ICD-10-CM

## 2018-06-12 DIAGNOSIS — R42: Primary | ICD-10-CM

## 2018-06-12 DIAGNOSIS — E11.9: ICD-10-CM

## 2018-06-12 DIAGNOSIS — J45.909: ICD-10-CM

## 2018-06-12 DIAGNOSIS — Z87.891: ICD-10-CM

## 2018-06-12 DIAGNOSIS — Z79.84: ICD-10-CM

## 2018-06-12 DIAGNOSIS — I11.9: ICD-10-CM

## 2018-06-12 SDOH — ECONOMIC STABILITY - HOUSING INSECURITY: HOMELESSNESS: Z59.0

## 2018-06-12 NOTE — CARD
--------------- APPROVED REPORT --------------





EKG Measurement

Heart [id number]

PA 124P59

[id number]

[id number]

[id number]



<Conclusion>

Normal sinus rhythm

Biatrial enlargement

Abnormal ECG

## 2018-06-12 NOTE — ED PDOC
HPI: General Adult


Time Seen by Provider: 18 02:16


Chief Complaint (Nursing): Dizziness/Lightheaded


Chief Complaint (Provider): Dizziness


History Per: Patient


History/Exam Limitations: no limitations


Onset/Duration Of Symptoms: Persistent


Have you had recent travel within the past 21 days to any of the following 

countries: [place], [place], [place] or [place]?: No


Current Symptoms Are (Timing): Still Present


Recently: Seen In ED


Additional Complaint(s): 





38 year old female presents to ED with complaints of persistent dizziness and 

has a history of HTN, diabetes mellitus, and bed-seeking behavior. Patient is 

well known to ED and provider for multiple visits and homelessness. (-) nausea, 

vomiting, fever, headache, cough, SOB, or chest pain.





PCP: Rochelle Ozuna





Past Medical History


Reviewed: Historical Data, Nursing Documentation, Vital Signs


Vital Signs: 


 Last Vital Signs











Temp  97.1 F L  18 01:32


 


Pulse  87   18 05:25


 


Resp  18   18 05:25


 


BP  151/85 H  18 05:25


 


Pulse Ox  98   18 05:25














- Medical History


PMH: Asthma, Diabetes, Fractures (foot fracture), Gastritis, HTN, Pneumonia


   Denies: HIV, Hyperthyroidism, Migraine, Chronic Kidney Disease





- Surgical History


Surgical History: No Surg Hx





- Family History


Family History: States: Unknown Family Hx





- Living Arrangements


Living Arrangements: Other (Homeless)





- Social History


Current smoker - smoking cessation education provided: No


Ex-Smoker (has not smoked in the last 12 months): No


Alcohol: None


Drugs: Denies





- Home Medications


Home Medications: 


 Ambulatory Orders











 Medication  Instructions  Recorded


 


Cholecalciferol [Vitamin D 1000 IU] 1 tab PO QWK 18


 


Levofloxacin [Levaquin] 500 mg PO DAILY #3 tablet 18


 


MetFORMIN [glucoPHAGE] 500 mg PO BID #60 tab 18


 


NIFEdipine ER [Procardia XL] 30 mg PO DAILY #30 ter 18


 


cloNIDine [Catapres] 0.1 mg PO Q12H #60 tab 18


 


Ibuprofen [Motrin Tab] 1 tab PO QID #40 tab 18


 


Amoxicillin/Clavulanate [Augmentin 1 tab PO BID #14 tab 18





875 MG-125 MG]  


 


Naproxen [Naprosyn] 500 mg PO BID PRN #10 tab 18


 


Amoxicillin 875 mg PO BID #10 tablet 05/10/18


 


Albuterol HFA [Ventolin HFA 90 1 puff IH Q4 PRN #1 inh 18





mcg/actuation (8 g)]  


 


predniSONE [Prednisone] 60 mg PO DAILY #12 tab 18














- Allergies


Allergies/Adverse Reactions: 


 Allergies











Allergy/AdvReac Type Severity Reaction Status Date / Time


 


Seafood Allergy  SHORTNESS Uncoded 18 19:59





   OF BREATH  














Review of Systems


ROS Statement: Except As Marked, All Systems Reviewed And Found Negative


Constitutional: Negative for: Fever


Cardiovascular: Negative for: Chest Pain


Respiratory: Negative for: Cough, Shortness of Breath


Gastrointestinal: Negative for: Nausea, Vomiting


Neurological: Positive for: Dizziness.  Negative for: Headache





Physical Exam





- Reviewed


Nursing Documentation Reviewed: Yes


Vital Signs Reviewed: Yes





- Physical Exam


Appears: Positive for: Non-toxic, No Acute Distress


Head Exam: Positive for: ATRAUMATIC, NORMAL INSPECTION, NORMOCEPHALIC


Skin: Positive for: Normal Color, Warm, Dry


Eye Exam: Positive for: Normal appearance


Neck: Positive for: Normal


Cardiovascular/Chest: Positive for: Regular Rate, Rhythm.  Negative for: Murmur


Respiratory: Positive for: Normal Breath Sounds.  Negative for: Respiratory 

Distress


Gastrointestinal/Abdominal: Positive for: Normal Exam, Soft.  Negative for: 

Tenderness


Extremity: Positive for: Normal ROM.  Negative for: Deformity


Neurologic/Psych: Positive for: Alert, CNs II-XII (intact), Oriented, 

Cerebellar Tests (intact), Gait (steady).  Negative for: Motor/Sensory Deficits





- ECG


ECG: Positive for: Interpreted By Me, Viewed By Me


ECG Rhythm: Positive for: Sinus Rhythm


Rate: 89 (04:18 18)


O2 Sat by Pulse Oximetry: 97 (RA)


Pulse Ox Interpretation: Normal





Medical Decision Making


Medical Decision Makin


Initial impression: 38 year old female with non-specific dizziness


Initial plan:


* EKG


* Accucheck





0500


Accucheck: 107


Patient notes improvement in symptoms and is stable for discharge home.


Dx: dizziness








Scribe Attestation:


Documented by Evy Pillai acting as a scribe for Abimael Vazquez MD.





MD Scribe Attestation: 


All medical record entries made by the Scribe were at my direction and 

personally dictated by me. I have reviewed the chart and agree that the record 

accurately reflects my personal performance of the history, physical exam, 

medical decision making, and the department course for this patient. I have 

also personally directed, reviewed, and agree with the discharge instructions 

and disposition.





Disposition





- Clinical Impression


Clinical Impression: 


 Dizzy spells








- Patient ED Disposition


Is Patient to be Admitted: No





- Disposition


Disposition: Routine/Home


Disposition Time: 05:00


Condition: IMPROVED


Forms:  [facility] (English)

## 2018-06-14 ENCOUNTER — HOSPITAL ENCOUNTER (EMERGENCY)
Dept: HOSPITAL 14 - H.ER | Age: 38
Discharge: HOME | End: 2018-06-14
Payer: MEDICAID

## 2018-06-14 VITALS — BODY MASS INDEX: 37.3 KG/M2

## 2018-06-14 VITALS — TEMPERATURE: 97.1 F | DIASTOLIC BLOOD PRESSURE: 63 MMHG | SYSTOLIC BLOOD PRESSURE: 146 MMHG | HEART RATE: 95 BPM

## 2018-06-14 VITALS — RESPIRATION RATE: 17 BRPM

## 2018-06-14 DIAGNOSIS — I51.7: ICD-10-CM

## 2018-06-14 DIAGNOSIS — E11.9: ICD-10-CM

## 2018-06-14 DIAGNOSIS — R42: Primary | ICD-10-CM

## 2018-06-14 DIAGNOSIS — Z79.84: ICD-10-CM

## 2018-06-14 DIAGNOSIS — J45.909: ICD-10-CM

## 2018-06-14 DIAGNOSIS — I11.9: ICD-10-CM

## 2018-06-14 DIAGNOSIS — I10: ICD-10-CM

## 2018-06-14 DIAGNOSIS — R51: ICD-10-CM

## 2018-06-14 LAB
ALBUMIN SERPL-MCNC: 4.1 G/DL (ref 3.5–5)
ALBUMIN/GLOB SERPL: 1.2 {RATIO} (ref 1–2.1)
ALT SERPL-CCNC: 25 U/L (ref 9–52)
AST SERPL-CCNC: 24 U/L (ref 14–36)
BASOPHILS # BLD AUTO: 0.2 K/UL (ref 0–0.2)
BASOPHILS NFR BLD: 1.4 % (ref 0–2)
BUN SERPL-MCNC: 8 MG/DL (ref 7–17)
CALCIUM SERPL-MCNC: 9.1 MG/DL (ref 8.4–10.2)
EOSINOPHIL # BLD AUTO: 0.2 K/UL (ref 0–0.7)
EOSINOPHIL NFR BLD: 1.7 % (ref 0–4)
ERYTHROCYTE [DISTWIDTH] IN BLOOD BY AUTOMATED COUNT: 16.2 % (ref 11.5–14.5)
GFR NON-AFRICAN AMERICAN: > 60
HGB BLD-MCNC: 12.3 G/DL (ref 12–16)
LYMPHOCYTES # BLD AUTO: 3 K/UL (ref 1–4.3)
LYMPHOCYTES NFR BLD AUTO: 25.7 % (ref 20–40)
MCH RBC QN AUTO: 23.7 PG (ref 27–31)
MCHC RBC AUTO-ENTMCNC: 31.6 G/DL (ref 33–37)
MCV RBC AUTO: 75 FL (ref 81–99)
MONOCYTES # BLD: 0.7 K/UL (ref 0–0.8)
MONOCYTES NFR BLD: 5.8 % (ref 0–10)
NEUTROPHILS # BLD: 7.5 K/UL (ref 1.8–7)
NEUTROPHILS NFR BLD AUTO: 65.4 % (ref 50–75)
NRBC BLD AUTO-RTO: 0.1 % (ref 0–0)
PLATELET # BLD: 172 K/UL (ref 130–400)
PMV BLD AUTO: 9.7 FL (ref 7.2–11.7)
RBC # BLD AUTO: 5.19 MIL/UL (ref 3.8–5.2)
WBC # BLD AUTO: 11.5 K/UL (ref 4.8–10.8)

## 2018-06-14 NOTE — ED PDOC
- Laboratory Results


Result Diagrams: 


 18 07:45





 18 07:45





- ECG


O2 Sat by Pulse Oximetry: 98 (RA)





Medical Decision Making


Medical Decision Makin:00


Patient endorsed to me by Dr. Lepe pending labs.


   


________________________________________________________________________________

_____________________________________________________


Scribe Attestation:   


Documented by Nitesh Stanley, acting as a scribe for Hilaria Nelson MD.





Provider Scribe Attestation:


All medical record entries made by the Scribe were at my direction and 

personally dictated by me. I have reviewed the chart and agree that the record 

accurately reflects my personal performance of the history, physical exam, 

medical decision making, and the department course for this patient. I have 

also personally directed, reviewed, and agree with the discharge instructions 

and disposition. 





Disposition





- Clinical Impression


Clinical Impression: 


 Dizziness, Hypertension








- POA


Present On Arrival: None





- Disposition


Referrals: 


Missael Tate MD [Primary Care Provider] - 


Disposition: Routine/Home


Disposition Time: 08:55


Condition: IMPROVED


Additional Instructions: 


TAKE YOUR MEDICATIONS AS PRESCRIBED!


Instructions:  High Blood Pressure in Adults, Dizziness, Nonvertigo, (DC)


Forms:  [facility] (English)

## 2018-06-14 NOTE — ED PDOC
HPI: General Adult


Time Seen by Provider: 06/14/18 05:16


Chief Complaint (Nursing): Headache


Chief Complaint (Provider): Dizziness


History Per: Patient


History/Exam Limitations: no limitations


Onset/Duration Of Symptoms: Hrs (x5 pta)


Current Symptoms Are (Timing): Still Present


Additional Complaint(s): 


38 year old female with a history of htn, diabetes, and bed seeking behavior 

presents to the emergency department complaining of dizziness. Patient has been 

seen here for similar symptoms in the past, but is not sure if this time it is 

due to elevated blood pressure. Upon entering the room, the patient was asleep 

comfortably. Denies chest pain, shortness of breath, and fever. 





PMD: none provided





Past Medical History


Reviewed: Historical Data, Nursing Documentation, Vital Signs


Vital Signs: 


 Last Vital Signs











Temp  97.1 F L  06/14/18 09:30


 


Pulse  95 H  06/14/18 09:30


 


Resp  17   06/14/18 09:30


 


BP  146/63   06/14/18 09:30


 


Pulse Ox  98   06/16/18 10:04














- Medical History


PMH: Asthma, Diabetes, Fractures (foot fracture), Gastritis, HTN, Pneumonia


   Denies: HIV, Hyperthyroidism, Migraine, Chronic Kidney Disease





- Surgical History


Surgical History: No Surg Hx





- Family History


Family History: States: Unknown Family Hx





- Home Medications


Home Medications: 


 Ambulatory Orders











 Medication  Instructions  Recorded


 


Cholecalciferol [Vitamin D 1000 IU] 1 tab PO QWK 04/05/18


 


Levofloxacin [Levaquin] 500 mg PO DAILY #3 tablet 04/08/18


 


MetFORMIN [glucoPHAGE] 500 mg PO BID #60 tab 04/08/18


 


NIFEdipine ER [Procardia XL] 30 mg PO DAILY #30 ter 04/08/18


 


cloNIDine [Catapres] 0.1 mg PO Q12H #60 tab 04/08/18


 


Ibuprofen [Motrin Tab] 1 tab PO QID #40 tab 04/16/18


 


Amoxicillin/Clavulanate [Augmentin 1 tab PO BID #14 tab 05/02/18





875 MG-125 MG]  


 


Naproxen [Naprosyn] 500 mg PO BID PRN #10 tab 05/07/18


 


Amoxicillin 875 mg PO BID #10 tablet 05/10/18


 


Albuterol HFA [Ventolin HFA 90 1 puff IH Q4 PRN #1 inh 05/30/18





mcg/actuation (8 g)]  


 


predniSONE [Prednisone] 60 mg PO DAILY #12 tab 05/30/18














- Allergies


Allergies/Adverse Reactions: 


 Allergies











Allergy/AdvReac Type Severity Reaction Status Date / Time


 


Seafood Allergy  SHORTNESS Uncoded 06/14/18 05:22





   OF BREATH  














Review of Systems


ROS Statement: Except As Marked, All Systems Reviewed And Found Negative


Constitutional: Negative for: Fever


Cardiovascular: Negative for: Chest Pain


Respiratory: Negative for: Shortness of Breath


Neurological: Positive for: Dizziness





Physical Exam





- Reviewed


Nursing Documentation Reviewed: Yes


Vital Signs Reviewed: Yes





- Physical Exam


Appears: Positive for: No Acute Distress (sleeping upon entering the room)


Head Exam: Positive for: ATRAUMATIC, NORMOCEPHALIC


Skin: Positive for: Normal Color, Warm, Dry


Neck: Positive for: Normal, Painless ROM, Supple


Cardiovascular/Chest: Positive for: Regular Rate, Rhythm.  Negative for: Murmur


Respiratory: Positive for: Normal Breath Sounds.  Negative for: Accessory 

Muscle Use, Wheezing, Respiratory Distress


Gastrointestinal/Abdominal: Positive for: Normal Exam, Soft.  Negative for: 

Tenderness


Back: Positive for: Normal Inspection.  Negative for: L CVA Tenderness, R CVA 

Tenderness, Vertebral Tenderness


Extremity: Positive for: Normal ROM


Neurologic/Psych: Positive for: Alert, Oriented (x3).  Negative for: Motor/

Sensory Deficits





- Laboratory Results


Result Diagrams: 


 06/14/18 07:45





 06/14/18 07:45





- ECG


O2 Sat by Pulse Oximetry: 98 (RA)


Pulse Ox Interpretation: Normal





Medical Decision Making


Medical Decision Making: 


Initial Impression: dizziness, hypertension


Time: 6:16


Initial Plan:


--EKG


--Catapres 0.1mg PO





06:56 After the Catapres, blood pressure is still elevated. Blood work will be 

ordered and she will be given Labetelol.





--------------------------------------------------------------------------------

----------------------------------


Scribe Attestation:


Documented by Yuliana Urrutia, acting as a scribe for Barrett Lepe MD.





Provider Scribe Attestation:


All medical entries made by the Scribe were at my direction and personally 

dictated by me. I have reviewed the chart and agree that the record accurately 

reflects my personal performance of the history, physical exam, medical 

decision making, and the department course for this patient. I have also 

personally directed, reviewed, and agree with the discharge instructions and 

disposition. 





Disposition





- Clinical Impression


Clinical Impression: 


 Dizziness, Hypertension








- Patient ED Disposition


Is Patient to be Admitted: Transfer of Care


Counseled Patient/Family Regarding: Studies Performed, Diagnosis, Need For 

Followup





- Disposition


Referrals: 


Missael Tate MD [Primary Care Provider] - 


Disposition: Routine/Home


Disposition Time: 07:00


Condition: IMPROVED


Additional Instructions: 


TAKE YOUR MEDICATIONS AS PRESCRIBED!


Instructions:  High Blood Pressure in Adults, Dizziness, Nonvertigo, (DC)


Forms:  [facility] (English)


Patient Signed Over To: Hilaria Nelson

## 2018-06-15 NOTE — CARD
--------------- APPROVED REPORT --------------





EKG Measurement

Heart [id number]

VT 122P63

[id number]

[id number]

[id number]



<Conclusion>

Normal sinus rhythm

Right atrial enlargement

Borderline ECG

## 2018-06-16 VITALS — OXYGEN SATURATION: 98 %

## 2018-07-04 ENCOUNTER — HOSPITAL ENCOUNTER (EMERGENCY)
Dept: HOSPITAL 14 - H.ER | Age: 38
Discharge: HOME | End: 2018-07-04
Payer: MEDICAID

## 2018-07-04 VITALS — BODY MASS INDEX: 37.3 KG/M2

## 2018-07-04 VITALS — HEART RATE: 86 BPM | SYSTOLIC BLOOD PRESSURE: 164 MMHG | DIASTOLIC BLOOD PRESSURE: 97 MMHG | RESPIRATION RATE: 15 BRPM

## 2018-07-04 VITALS — TEMPERATURE: 98 F | OXYGEN SATURATION: 98 %

## 2018-07-04 DIAGNOSIS — E11.9: ICD-10-CM

## 2018-07-04 DIAGNOSIS — I11.9: ICD-10-CM

## 2018-07-04 DIAGNOSIS — Z79.84: ICD-10-CM

## 2018-07-04 DIAGNOSIS — I51.7: ICD-10-CM

## 2018-07-04 DIAGNOSIS — I10: Primary | ICD-10-CM

## 2018-07-04 NOTE — ED PDOC
HPI: General Adult


Time Seen by Provider: 07/04/18 03:00


Chief Complaint (Nursing): Dizziness/Lightheaded


Chief Complaint (Provider): dizziness


History Per: Patient


History/Exam Limitations: no limitations


Onset/Duration Of Symptoms: Hrs


Additional Complaint(s): 





Ngozi Malik is a 38 year old female, with a past medical history of 

HTN and diabetes, who presents to the emergency department complaining of 

dizziness onset today. Patient is  un domicile and has been seen in the ED many 

times for similar complaints. She denies any fever, chills or any other 

physical complaints. every time i walk into the room pt is sleeping comfortably 

in no distress with patent airway and comfortable





PMD: None provided. 





Past Medical History


Reviewed: Historical Data, Nursing Documentation, Vital Signs


Vital Signs: 


 Last Vital Signs











Temp  98.0 F   07/04/18 03:22


 


Pulse  86   07/04/18 06:29


 


Resp  15   07/04/18 06:29


 


BP  164/97 H  07/04/18 06:29


 


Pulse Ox  98   07/05/18 05:28














- Medical History


PMH: Asthma, Diabetes, Fractures (foot fracture), Gastritis, HTN, Pneumonia


   Denies: HIV, Hyperthyroidism, Migraine, Chronic Kidney Disease





- Surgical History


Surgical History: No Surg Hx





- Family History


Family History: States: Unknown Family Hx





- Social History


Current smoker - smoking cessation education provided: No


Drugs: Denies





- Home Medications


Home Medications: 


 Ambulatory Orders











 Medication  Instructions  Recorded


 


Cholecalciferol [Vitamin D 1000 IU] 1 tab PO QWK 04/05/18


 


Levofloxacin [Levaquin] 500 mg PO DAILY #3 tablet 04/08/18


 


MetFORMIN [glucoPHAGE] 500 mg PO BID #60 tab 04/08/18


 


NIFEdipine ER [Procardia XL] 30 mg PO DAILY #30 ter 04/08/18


 


cloNIDine [Catapres] 0.1 mg PO Q12H #60 tab 04/08/18


 


Ibuprofen [Motrin Tab] 1 tab PO QID #40 tab 04/16/18


 


Amoxicillin/Clavulanate [Augmentin 1 tab PO BID #14 tab 05/02/18





875 MG-125 MG]  


 


Naproxen [Naprosyn] 500 mg PO BID PRN #10 tab 05/07/18


 


Amoxicillin 875 mg PO BID #10 tablet 05/10/18


 


Albuterol HFA [Ventolin HFA 90 1 puff IH Q4 PRN #1 inh 05/30/18





mcg/actuation (8 g)]  


 


predniSONE [Prednisone] 60 mg PO DAILY #12 tab 05/30/18














- Allergies


Allergies/Adverse Reactions: 


 Allergies











Allergy/AdvReac Type Severity Reaction Status Date / Time


 


Seafood Allergy  SHORTNESS Uncoded 06/14/18 05:22





   OF BREATH  














Review of Systems


ROS Statement: Except As Marked, All Systems Reviewed And Found Negative


Constitutional: Negative for: Fever, Chills


Neurological: Positive for: Dizziness





Physical Exam





- Reviewed


Nursing Documentation Reviewed: Yes


Vital Signs Reviewed: Yes





- Physical Exam


Appears: Positive for: Non-toxic, No Acute Distress


Head Exam: Positive for: ATRAUMATIC, NORMAL INSPECTION, NORMOCEPHALIC


Skin: Positive for: Normal Color, Warm, Dry


Eye Exam: Positive for: Normal appearance, EOMI, PERRL


Neck: Positive for: Painless ROM, Supple


Cardiovascular/Chest: Positive for: Regular Rate, Rhythm.  Negative for: Murmur


Respiratory: Positive for: Normal Breath Sounds.  Negative for: Respiratory 

Distress


Gastrointestinal/Abdominal: Positive for: Normal Exam, Soft.  Negative for: 

Tenderness


Back: Positive for: Normal Inspection.  Negative for: L CVA Tenderness, R CVA 

Tenderness, Vertebral Tenderness


Extremity: Positive for: Normal ROM (upper and lower extremities).  Negative for

: Deformity, Swelling


Neurologic/Psych: Positive for: Alert, Oriented





- ECG


O2 Sat by Pulse Oximetry: 98 (RA)


Pulse Ox Interpretation: Normal





Medical Decision Making


Medical Decision Making: 





Time: 03:23


Initial Impression: dizziness





Initial Plan:





--Reevaluation - pt sleeping comfortably. when i wake her up she said that her 

symptoms are gone. labs reviewed.





05:05


-Patient reports feeling better.  instrcuted her on importance to follow up. 

Upon provider reevaluation patient is feeling better, is medically stable, and 

requires no further treatment in the ED at this time. Patient will be 

discharged home. Counseling was provided and all questions were answered 

regarding diagnosis and need for follow up with PMD. There is agreement to 

discharge plan. Return if symptoms persist or worsen.





--------------------------------------------------------------------------------

-----





Scribe Attestation:


Documented by Rehan Triplett, acting as a scribe for Barrett Lepe MD.





Provider Scribe Attestation:


All medical record entries made by the Scribe were at my direction and 

personally dictated by me. I have reviewed the chart and agree that the record 

accurately reflects my personal performance of the history, physical exam, 

medical decision making, and the department course for this patient. I have 

also personally directed, reviewed, and agree with the discharge instructions 

and disposition.





Disposition





- Clinical Impression


Clinical Impression: 


 Hypertension








- Patient ED Disposition


Is Patient to be Admitted: No


Counseled Patient/Family Regarding: Studies Performed, Diagnosis, Need For 

Followup





- Disposition


Disposition: Routine/Home


Disposition Time: 05:05


Condition: IMPROVED


Additional Instructions: 


follow up with your pirmary doctor in 1-2 days


return to the ED with any worsening or concerning symptoms


take your medicines


Instructions:  High Blood Pressure (DC)


Forms:  [facility] (English)

## 2018-07-05 NOTE — CARD
--------------- APPROVED REPORT --------------





EKG Measurement

Heart [id number]

OR [id number]

[id number]

[id number]

[id number]



<Conclusion>

Normal sinus rhythm

Biatrial enlargement

Nonspecific T wave abnormality

Suggestive of left ventricular hypertrophy

Abnormal ECG

## 2018-10-13 ENCOUNTER — HOSPITAL ENCOUNTER (EMERGENCY)
Dept: HOSPITAL 14 - H.ER | Age: 38
Discharge: HOME | End: 2018-10-13
Payer: SELF-PAY

## 2018-10-13 VITALS — TEMPERATURE: 98.2 F | RESPIRATION RATE: 18 BRPM | OXYGEN SATURATION: 98 %

## 2018-10-13 VITALS — HEART RATE: 89 BPM

## 2018-10-13 VITALS — BODY MASS INDEX: 37.3 KG/M2

## 2018-10-13 VITALS — DIASTOLIC BLOOD PRESSURE: 89 MMHG | SYSTOLIC BLOOD PRESSURE: 180 MMHG

## 2018-10-13 DIAGNOSIS — M79.671: Primary | ICD-10-CM

## 2018-10-13 DIAGNOSIS — I10: ICD-10-CM

## 2018-10-13 DIAGNOSIS — F17.210: ICD-10-CM

## 2018-10-13 NOTE — ED PDOC
Lower Extremity Pain/Injury


Time Seen by Provider: 10/13/18 05:01


Chief Complaint (Nursing): Lower Extremity Problem/Injury


Chief Complaint (Provider): Lower Extremity Problem/Injury


History Per: Patient


History/Exam Limitations: no limitations


Current Symptoms Are (Timing): Still Present


Additional Complaint(s): 





38 year old female with a history of htn presents to the ED with chronic right l

eg pain. Patient denies any injury and ambulated into room by herself. She was 

asleep during the initial exam. Patient has been seen in this ED for similar 

symptoms. At this time, she denies any other complaints. She is compliant with 

her htn medications. 





PMD: [email or web address]





Past Medical History


Reviewed: Historical Data, Nursing Documentation, Vital Signs


Vital Signs: 





                                Last Vital Signs











Temp  98.2 F   10/13/18 03:50


 


Pulse  94 H  10/13/18 05:11


 


Resp  18   10/13/18 05:11


 


BP  180/89 H  10/13/18 05:11


 


Pulse Ox  98   10/13/18 03:50














- Medical History


PMH: Asthma, Diabetes, Fractures (foot fracture), Gastritis, HTN, Pneumonia


   Denies: HIV, Hyperthyroidism, Migraine, Chronic Kidney Disease





- Surgical History


Surgical History: No Surg Hx





- Family History


Family History: States: Unknown Family Hx





- Home Medications


Home Medications: 


                                Ambulatory Orders











 Medication  Instructions  Recorded


 


RX: Cholecalciferol [Vitamin D 1 tab PO QWK 04/05/18





1000 IU]  


 


Levofloxacin [Levaquin] 500 mg PO DAILY #3 tablet 04/08/18


 


RX: MetFORMIN [glucoPHAGE] 500 mg PO BID #60 tab 04/08/18


 


RX: NIFEdipine ER [Procardia XL] 30 mg PO DAILY #30 ter 04/08/18


 


RX: cloNIDine [Catapres] 0.1 mg PO Q12H #60 tab 04/08/18


 


RX: Ibuprofen [Motrin Tab] 1 tab PO QID #40 tab 04/16/18


 


Amoxicillin/Clavulanate [Augmentin 1 tab PO BID #14 tab 05/02/18





875 MG-125 MG]  


 


RX: Naproxen [Naprosyn] 500 mg PO BID PRN #10 tab 05/07/18


 


RX: Amoxicillin 875 mg PO BID #10 tablet 05/10/18


 


RX: Albuterol HFA [Ventolin HFA 90 1 puff IH Q4 PRN #1 inh 05/30/18





mcg/actuation (8 g)]  


 


predniSONE [Prednisone] 60 mg PO DAILY #12 tab 05/30/18














- Allergies


Allergies/Adverse Reactions: 


                                    Allergies











Allergy/AdvReac Type Severity Reaction Status Date / Time


 


Seafood Allergy  SHORTNESS Uncoded 10/13/18 04:34





   OF BREATH  














Review of Systems


ROS Statement: Except As Marked, All Systems Reviewed And Found Negative


Musculoskeletal: Positive for: Leg Pain (right)





Physical Exam





- Reviewed


Nursing Documentation Reviewed: Yes


Vital Signs Reviewed: Yes





- Physical Exam


Appears: Positive for: No Acute Distress (comfortable and sleepy)


Head Exam: Positive for: ATRAUMATIC


Skin: Positive for: Normal Color, Warm, Dry


Eye Exam: Positive for: Normal appearance, EOMI, PERRL


Cardiovascular/Chest: Positive for: Regular Rate, Rhythm.  Negative for: Murmur


Respiratory: Positive for: Normal Breath Sounds.  Negative for: Respiratory 

Distress


Extremity: Positive for: Other (no other signs of injury).  Negative for: Pedal 

Edema, Deformity, Swelling


Neurologic/Psych: Positive for: Alert, Oriented, Gait (steady when ambulating)





- ECG


O2 Sat by Pulse Oximetry: 98 (RA)


Pulse Ox Interpretation: Normal





Medical Decision Making


Medical Decision Making: 


Time: 0515


Initial Impression: Chronic leg pain without injury and chronic htn


--Patient slept in emergency room. Her symptoms significantly improved after 

sleep and she agreed to discharge home. 











 

--------------------------------------------------------------------------------


-----------------


Scribe Attestation:


Documented by Karina Pham, acting as a scribe for Kelsey Pascal MD





Provider Scribe Attestation:


All medical record entries made by the Scribe were at my direction and 

personally dictated by me. I have reviewed the chart and agree that the record 

accurately reflects my personal performance of the history, physical exam, 

medical decision making, and the department course for this patient. I have also

 personally directed, reviewed, and agree with the discharge instructions and 

disposition.





Disposition





- Clinical Impression


Clinical Impression: 


 Right foot pain, Hypertension








- Patient ED Disposition


Is Patient to be Admitted: No


Doctor Will See Patient In The: Office


Counseled Patient/Family Regarding: Studies Performed, Diagnosis, Need For 

Followup





- Disposition


Referrals: 


Missael Tate MD [Staff Provider] - 


Disposition: Routine/Home


Disposition Time: 05:00


Condition: GOOD


Instructions:  High Blood Pressure in Adults
